# Patient Record
Sex: MALE | Race: WHITE | Employment: OTHER | URBAN - METROPOLITAN AREA
[De-identification: names, ages, dates, MRNs, and addresses within clinical notes are randomized per-mention and may not be internally consistent; named-entity substitution may affect disease eponyms.]

---

## 2018-08-03 ENCOUNTER — HOSPITAL ENCOUNTER (EMERGENCY)
Age: 78
Discharge: HOME | End: 2018-08-03
Payer: MEDICARE

## 2018-08-03 ENCOUNTER — APPOINTMENT (OUTPATIENT)
Dept: ULTRASOUND IMAGING | Age: 78
End: 2018-08-03
Payer: MEDICARE

## 2018-08-03 VITALS
BODY MASS INDEX: 25.48 KG/M2 | RESPIRATION RATE: 16 BRPM | WEIGHT: 182 LBS | HEIGHT: 71 IN | SYSTOLIC BLOOD PRESSURE: 115 MMHG | DIASTOLIC BLOOD PRESSURE: 73 MMHG | OXYGEN SATURATION: 99 % | TEMPERATURE: 98.1 F | HEART RATE: 76 BPM

## 2018-08-03 DIAGNOSIS — M79.605 LEFT LEG PAIN: Primary | ICD-10-CM

## 2018-08-03 PROCEDURE — 93971 EXTREMITY STUDY: CPT

## 2018-08-03 PROCEDURE — 99283 EMERGENCY DEPT VISIT LOW MDM: CPT

## 2018-08-03 ASSESSMENT — PAIN DESCRIPTION - LOCATION: LOCATION: LEG

## 2018-08-03 ASSESSMENT — PAIN DESCRIPTION - DESCRIPTORS: DESCRIPTORS: ACHING

## 2018-08-03 ASSESSMENT — PAIN SCALES - GENERAL: PAINLEVEL_OUTOF10: 4

## 2018-08-03 ASSESSMENT — PAIN DESCRIPTION - ORIENTATION: ORIENTATION: LOWER;LEFT

## 2018-08-03 NOTE — ED PROVIDER NOTES
along the deep venous system yes (1)     Paralysis, paresis, or recent cast immobilization of lower extremities no (0)     Recently bedridden ? 3 days, or major surgery requiring regional or                    general anesthetic in the past 12 weeks no (0)     Alternative diagnosis at least as likely no (0)     TOTAL SCORE 4     * Those with Wells scores of two or more have a 28% chance of having DVT, those with    a lower score have 6% odds. ** Alternatively, Wells scores can be categorized as high if greater than two, moderate if      one or two, and low if less than one, with likelihoods of 53%, 17%, and 5%     respectively. ROS    Pertinent positives and negatives are stated within HPI, all other systems reviewed and are negative. Past Surgical History:  has a past surgical history that includes Appendectomy; Neck surgery; Prostatectomy (7/15/2013); and Colonoscopy (5/09/2016). Social History:  reports that he has never smoked. He has never used smokeless tobacco. He reports that he drinks about 2.4 oz of alcohol per week . He reports that he does not use drugs. Family History: family history includes Arthritis in his mother; Diabetes in his father; Stroke in his father. Allergies: Patient has no known allergies. Physical Exam          ED Triage Vitals   BP Temp Temp src Pulse Resp SpO2 Height Weight   08/03/18 1144 08/03/18 1144 -- 08/03/18 1144 08/03/18 1148 08/03/18 1144 08/03/18 1144 08/03/18 1144   115/73 98.1 °F (36.7 °C)  76 16 99 % 5' 11\" (1.803 m) 182 lb (82.6 kg)     Oxygen Saturation Interpretation: Normal.    Constitutional:  Alert, development consistent with age. HEENT:  NC/NT. Airway patent. Neck:  Normal ROM. Supple. Respiratory:  Clear to auscultation and breath sounds equal.  CV:  Regular rate and rhythm, normal heart sounds, without pathological murmurs, ectopy, gallops, or rubs. GI:  Abdomen Soft, nontender, good bowel sounds. No firm or pulsatile mass.   Lower Ext.: Left: lower leg and thigh. Tenderness: Mild. Swelling: Mild. Deformity: No.             ROM: full range of motion. Calf:  Left, Posterior calf tenderness present. .            Edema:  pitting Left lower extremity(s). Skin:  tenderness and swelling. Distal Function:              Motor deficit: none. Sensory deficit: none. Pulse deficit: none. Capillary refill: normal.  Integument:  Normal turgor. Warm, dry, without visible rash, unless noted elsewhere. Neurological:  Oriented. Motor functions intact. Lab / Imaging Results   (All laboratory and radiology results have been personally reviewed by myself)  Labs:  No results found for this visit on 08/03/18. Imaging: All Radiology results interpreted by Radiologist unless otherwise noted. US DUP LOWER EXTREMITY LEFT JOSEPH   Final Result   No evidence of deep venous thrombosis in the left lower extremity. ED Course / Medical Decision Making   Medications - No data to display     Consult(s):   None    Procedure(s):   none    MDM:   DVT by ultrasound. Plan is for patient to follow with primary care and continue anti-inflammatory usage. Counseling: The emergency provider has spoken with the patient and spouse/SO and discussed todays results, in addition to providing specific details for the plan of care and counseling regarding the diagnosis and prognosis. Questions are answered at this time and they are agreeable with the plan. Assessment      1. Left leg pain      Plan   Discharge to home  Patient condition is good    New Medications     New Prescriptions    No medications on file     Electronically signed by CHANDLER Dey   DD: 8/3/18  **This report was transcribed using voice recognition software. Every effort was made to ensure accuracy; however, inadvertent computerized transcription errors may be present.   END OF ED PROVIDER NOTE       Daljit Arciniega, [address]  08/03/18 0907

## 2018-08-07 ENCOUNTER — CARE COORDINATION (OUTPATIENT)
Dept: CARE COORDINATION | Age: 78
End: 2018-08-07

## 2018-08-07 NOTE — CARE COORDINATION
Ambulatory Care Coordination ED Follow up Call       Reason for ED Visit:  Leg pain  Care Management Risk Score: CMRS 0  How are you feeling? :     improved  Patient Reports the following:  Patient reports his leg is fine. He has no pain and the swelling has been there his whole life. Did you call your PCP prior to going to the ED? Yes          Post Discharge Status:  What health concerns since you left the Emergency Room? None    Do you have wounds that you are caring for at home? No    Do you have a follow up appt scheduled? No. Was told as needed. Review of Instructions:                                 Do you have any questions regarding your discharge instructions?:  No  Medications:    What questions do you have about your medications? None  Are you taking your medications as directed? If not - why? Yes   Can you afford your medications? yes  ADLS:  Do you need assistance of any kind at home? No   What assistance is needed? None      FU appts/Provider:    Future Appointments  Date Time Provider Josue Salazar   9/7/2018 10:00 AM DO MARSHALL Chapman [address] Maintenance Due   Topic Date Due    DTaP/Tdap/Td vaccine (1 - Tdap) 06/10/1960    Shingles Vaccine (1 of 2 - 2 Dose Series) 06/10/1991    Pneumococcal low/med risk (1 of 2 - PCV13) 06/10/2006     Patient advised to contact PCP office to have HM items/records faxed to PCP Office directly?   N/A

## 2018-08-20 PROBLEM — E78.01 FAMILIAL HYPERCHOLESTEROLEMIA: Status: ACTIVE | Noted: 2018-08-20

## 2018-11-07 ENCOUNTER — EVALUATION (OUTPATIENT)
Dept: PHYSICAL THERAPY | Age: 78
End: 2018-11-07
Payer: MEDICARE

## 2018-11-07 DIAGNOSIS — R10.32 LEFT INGUINAL PAIN: Primary | ICD-10-CM

## 2018-11-07 PROBLEM — R10.31 RIGHT INGUINAL PAIN: Status: ACTIVE | Noted: 2018-11-07

## 2018-11-07 PROCEDURE — G8979 MOBILITY GOAL STATUS: HCPCS | Performed by: PHYSICAL THERAPIST

## 2018-11-07 PROCEDURE — 97162 PT EVAL MOD COMPLEX 30 MIN: CPT | Performed by: PHYSICAL THERAPIST

## 2018-11-07 PROCEDURE — G8978 MOBILITY CURRENT STATUS: HCPCS | Performed by: PHYSICAL THERAPIST

## 2018-11-07 NOTE — PROGRESS NOTES
[facility] OUTPATIENT REHABILITATION  PHYSICAL THERAPY INITIAL EVALUATION         Date:  2018   Patient: Gaby Pearson  : 1940  MRN: [id number]  Referring Provider: Daryn Lagos DO  2 Rehabilitation [facility] Shaila Payne      Medical Diagnosis:      Diagnosis Orders   1. Left inguinal pain         Onset date: 5-6 weeks ago    Mechanism of Injury: Strained leg when exiting truck. He reports he had new running boards put on his truck and had to reach out/stretch out to avoid the running board and contact ground. Reports he has pain first thing in the morning, it eases with movements and daily activities, then returns in evening. Also has delayed pain -- can push mow the lawn comfortably, but is sore the next day. Treatment: naproxen       Chief complaint: Pain in anterior leg from hip to knee     SUBJECTIVE:     Pat Medical History  Past Medical History:   Diagnosis Date    Anxiety     GERD (gastroesophageal reflux disease)     Hyperlipidemia     Hypertension     Predominant disturbance of emotions     Prostate cancer [facility]) 2013    Stress      Past Surgical History:   Procedure Laterality Date    APPENDECTOMY      COLONOSCOPY  2016    NECK SURGERY      PROSTATECTOMY  7/15/2013    laparoscpic robotic assisted. bilymph node dissection/ ureteroscopy       Medications:   Current Outpatient Prescriptions   Medication Sig Dispense Refill    citalopram (CELEXA) 20 MG tablet Take 1 tablet by mouth daily 90 tablet 1    hydrochlorothiazide (HYDRODIURIL) 50 MG tablet Take 1 tablet by mouth daily 90 tablet 1    pravastatin (PRAVACHOL) 20 MG tablet Take 1 tablet by mouth every evening 90 tablet 1    multivitamin-iron-minerals-folic acid (CENTRUM) chewable tablet Take 1 tablet by mouth daily.  Cholecalciferol (VITAMIN D) 2000 UNITS CAPS capsule Take by mouth Daily        No current facility-administered medications for this visit.         Imaging results: Impression Ultrasound 08/03/2018   No evidence of deep venous thrombosis in the left lower extremity. Pain:  Current: 4/10     Best: 0/10     Worst:6/10    Aggravated by: Pain first thing in the morning, evening pain, moving to standing position, stairs (up worse than down)    Relieved by: Light activity     Symptom Type/Quality: stabbing    Patient Goals: Pain control    Precautions/Contraindications: None    OBJECTIVE:     Inspection:  Standing  Knees:  [x] Normal  [] Genu Valgus [] Genu Varus  [] Genu Recurvatum    Tibia:  [x] Normal  [] Tibial Varus  [] Tibial Varum    Feet:  [x] Normal  [] Calcaneal Valgus   [] Calcaneal Varus   [] Hallux Valgus    [] Supination  [] Pronation          [] Pes Planus    [] Pes Cavus      Observations: Normal orthopedic exam    Gait:  Normal    Joint/Motion:  Trunk:  Trunk ROM is WFL. Repeated movements are painless and do not exacerbate hip pain. Abdominal straining had no effect. Tension on hip flexors bad    No ttp at hip or inguinal line      Hip arom and stretch pain    Hip:  Right:   AROM: 120° Flexion,  20° Extension, 40° Abduction,  40° ER, 40° IR  Left:   AROM: 120° Flexion,  20° Extension, 40° Abduction,  40° ER, 40° IR. Active hip flexion causes report of pain. Stretch applied to hip flexors also causes report of pain. Knee:  Right:   AROM: WNL and painless  Left:   AROM: WNL and painless    Strength:  Hip:  Right: Flexion 5/5,  Extension 5/5, Abduction 5/5, ER 5/5, IR 5/5    Left: Flexion 3/5,  Extension 5/5, Abduction 4/5, ER 5/5, IR 5/5     Knee:   Right: Flexion 5/5,  Extension 5/5  Left: Flexion 5/5,  Extension 5/5    Palpation: Tender to palpation area of quad approximately 4\" above patella. No step off deformity. Good quad control and strength. No tenderness at proximal hip.      Special Tests/Functional Screens:    [x] Hip Scour []+ / [x] -  [x] Joshua Gonsalez [x]+ / [] -   [] Trendelenburg []+ / [] -    [] Lachman's []+ / [] -    [] Anterior Drawer []+ / [] -   [] Valgus Stress []+ / [] -  [] Thessaly Test []+ / [] -   [] Ankle Inversion Stress []+ / [] -   [] Ankle Squeeze Test []+ / [] -   [] Other: []+ / [] - [x] Ute Delcid []+ / [x] -      [] TRACI []+ / [] -   [] Elizabeth []+ / [] -   [] Pivot Shift []+ / [] -   [] Posterior Drawer []+ / [] -   [] Varus Stress []+ / [] -   [] Ankle Ant Drawer []+ / [] -     [] Ankle Eversion Stress: []+ / [] -  [] Russell Test []+ / [] -         ASSESSMENT     Problems:   1. Pain reported 4-6/10  2. ROM: painful hip flexion AROM  3. Strength 3/5 hip flexors with pain  4. Decreased functional ability with daily activities     [x] There are no barriers affecting plan of care or recovery    [] Barriers to this patient's plan of care or recovery include. Domestic Concerns:  [x] No  [] Yes:    Short Term goals (3 weeks)  1. Decrease reported pain to 3-4/10  2. Increase Strength to 4/5     Long Term goals (6-8 weeks)  1. Decrease reported pain to 0/10  2. Increase Strength to 5/5   3. Able to perform/complete the following functions/tasks: daily chores with comfort and no next-day pain  4. Independent with Home Exercise Programs    Outcome Measure: PT G-Codes  Functional Limitation: Mobility: Walking and moving around  Mobility: Walking and Moving Around Current Status ():  At least 20 percent but less than 40 percent impaired, limited or restricted  Mobility: Walking and Moving Around Goal Status (): 0 percent impaired, limited or restricted    Rehab Potential: [x] Good  [] Fair  [] Poor    PLAN       Treatment Plan:  [x] Therapeutic Exercise  [x] Therapeutic Activity  [x] Neuromuscular Re-education   [] Gait Training  [] Balance Training  [] Aerobic conditioning  [] Manual Therapy  [] Massage/Fascial release   [] Work/Sport specific activities    [] Pain Neuroscience [x] Cold/hotpack  [] Vasocompression  [] Electrical Stimulation  [] Lumbar/Cervical Traction  [] Ultrasound   [] Iontophoresis: 4 mg/mL

## 2018-11-07 NOTE — PROGRESS NOTES
Activities        TA     Gait Training          GT     Neuro Re-education NR     Modalities     Non-Billable Service Time     Other     Total Time/Units 60 1

## 2018-11-14 ENCOUNTER — TREATMENT (OUTPATIENT)
Dept: PHYSICAL THERAPY | Age: 78
End: 2018-11-14
Payer: MEDICARE

## 2018-11-14 DIAGNOSIS — R10.32 LEFT INGUINAL PAIN: Primary | ICD-10-CM

## 2018-11-14 PROCEDURE — 97110 THERAPEUTIC EXERCISES: CPT | Performed by: PHYSICAL THERAPIST

## 2018-11-21 ENCOUNTER — TREATMENT (OUTPATIENT)
Dept: PHYSICAL THERAPY | Age: 78
End: 2018-11-21
Payer: MEDICARE

## 2018-11-21 DIAGNOSIS — R10.32 LEFT INGUINAL PAIN: Primary | ICD-10-CM

## 2018-11-21 PROCEDURE — 97110 THERAPEUTIC EXERCISES: CPT | Performed by: PHYSICAL THERAPIST

## 2018-11-21 NOTE — PROGRESS NOTES
Physical Therapy Daily Treatment Note    Date: 2018  Patient Name: Ryan Antonio  Spouse: Katty Vann  : 1940   MRN: [id number]  DOInjury: 6 weeks ago (~mid-Sept)   Referring Provider: Jordan Shipman DO  2 Rehabilitation [facility]      Medical Diagnosis:      Diagnosis Orders   1. Left inguinal pain         Outcome Measure:     PT G-Codes  Functional Limitation: Mobility: Walking and moving around  Mobility: Walking and Moving Around Current Status (): At least 20 percent but less than 40 percent impaired, limited or restricted  Mobility: Walking and Moving Around Goal Status (): 0 percent impaired, limited or restricted    S: Feeling much better since last Rx. Reports 50-60% improvement overall. O:   Time 9539-6046     Visit 3/12     Pain 4/10     ROM      Modalities      Ice   MO   Manual            Stretch      Prone self quad stretch  5-10 sec hold x 5 Added pillow under knee; little effect; removed TE   Supine or standing hip flexor stretch  Attempted; pain reported; stopped; may attempt next Rx  TE   Exercise      Nustep   TE   Heel slides Pain reported; discontinued   TE   QS Did just prior to PT; reviewed form   TE   Glute sets Did just prior to PT; reviewed form      SLR   TE   SAQ   TE   Bridging  Reviewed form  TE   Clamshell Reviewed form   TE   Knee Extension Machine   TE   Hamstring Curl Machine   TE   [] TG  [] Leg Press 2-leg   TE   [] TG  [] Leg Press 1-leg   TE   Step-ups - FWD Opted to avoid due to knee pain  TE   Standing hip flex Pain reported; stopped; may attempt next Rx  TE   Step-ups - BWD   TE   Calf Raises   TE   Squats Reviewed form; cues provided, doing at home. TE      TE               A:  Tolerated well. P: Continue with rehab plan; will see in 10 days.   Merari Giles PT    Treatment Charges: Mins Units   Initial Evaluation     Re-Evaluation     Ther Exercise         TE 15 1   Manual Therapy     MT     Ther Activities        TA     Gait Training

## 2018-12-04 ENCOUNTER — TREATMENT (OUTPATIENT)
Dept: PHYSICAL THERAPY | Age: 78
End: 2018-12-04
Payer: MEDICARE

## 2018-12-04 DIAGNOSIS — R10.32 LEFT INGUINAL PAIN: Primary | ICD-10-CM

## 2018-12-04 PROCEDURE — 97110 THERAPEUTIC EXERCISES: CPT | Performed by: PHYSICAL THERAPIST

## 2018-12-19 ENCOUNTER — TREATMENT (OUTPATIENT)
Dept: PHYSICAL THERAPY | Age: 78
End: 2018-12-19
Payer: MEDICARE

## 2018-12-19 DIAGNOSIS — R10.32 LEFT INGUINAL PAIN: Primary | ICD-10-CM

## 2018-12-19 PROCEDURE — G8978 MOBILITY CURRENT STATUS: HCPCS | Performed by: PHYSICAL THERAPIST

## 2018-12-19 PROCEDURE — 97110 THERAPEUTIC EXERCISES: CPT | Performed by: PHYSICAL THERAPIST

## 2018-12-19 PROCEDURE — G8979 MOBILITY GOAL STATUS: HCPCS | Performed by: PHYSICAL THERAPIST

## 2018-12-19 PROCEDURE — G8980 MOBILITY D/C STATUS: HCPCS | Performed by: PHYSICAL THERAPIST

## 2019-03-22 ENCOUNTER — HOSPITAL ENCOUNTER (OUTPATIENT)
Age: 79
Discharge: HOME | End: 2019-03-22
Payer: MEDICARE

## 2019-03-22 DIAGNOSIS — M79.10 MYALGIA: ICD-10-CM

## 2019-03-22 LAB — TOTAL CK: 63 U/L (ref 20–200)

## 2019-03-22 PROCEDURE — 82550 ASSAY OF CK (CPK): CPT

## 2019-03-22 PROCEDURE — 36415 COLL VENOUS BLD VENIPUNCTURE: CPT

## 2019-04-26 ENCOUNTER — HOSPITAL ENCOUNTER (OUTPATIENT)
Age: 79
Discharge: HOME | End: 2019-04-28
Payer: MEDICARE

## 2019-04-26 PROCEDURE — 87088 URINE BACTERIA CULTURE: CPT

## 2019-04-26 PROCEDURE — 88112 CYTOPATH CELL ENHANCE TECH: CPT

## 2019-04-26 PROCEDURE — 87186 SC STD MICRODIL/AGAR DIL: CPT

## 2019-04-28 LAB
ORGANISM: ABNORMAL
URINE CULTURE, ROUTINE: ABNORMAL
URINE CULTURE, ROUTINE: ABNORMAL

## 2019-05-14 ENCOUNTER — HOSPITAL ENCOUNTER (OUTPATIENT)
Age: 79
Discharge: HOME | End: 2019-05-16
Payer: MEDICARE

## 2019-05-14 PROCEDURE — 87088 URINE BACTERIA CULTURE: CPT

## 2019-05-16 LAB — URINE CULTURE, ROUTINE: NORMAL

## 2019-06-25 ENCOUNTER — HOSPITAL ENCOUNTER (OUTPATIENT)
Age: 79
Discharge: HOME | End: 2019-06-27
Payer: MEDICARE

## 2019-06-25 PROCEDURE — 88112 CYTOPATH CELL ENHANCE TECH: CPT

## 2019-08-12 ENCOUNTER — HOSPITAL ENCOUNTER (OUTPATIENT)
Dept: ULTRASOUND IMAGING | Age: 79
Discharge: HOME | End: 2019-08-12
Payer: MEDICARE

## 2019-08-12 DIAGNOSIS — R31.0 GROSS HEMATURIA: ICD-10-CM

## 2019-08-12 PROCEDURE — 76775 US EXAM ABDO BACK WALL LIM: CPT

## 2019-10-29 ENCOUNTER — HOSPITAL ENCOUNTER (OUTPATIENT)
Age: 79
Discharge: HOME | End: 2019-10-31
Payer: MEDICARE

## 2019-10-29 PROCEDURE — 88112 CYTOPATH CELL ENHANCE TECH: CPT

## 2020-02-18 ENCOUNTER — HOSPITAL ENCOUNTER (OUTPATIENT)
Age: 80
Discharge: HOME | End: 2020-02-20
Payer: MEDICARE

## 2020-02-18 PROCEDURE — 88112 CYTOPATH CELL ENHANCE TECH: CPT

## 2021-07-06 ENCOUNTER — HOSPITAL ENCOUNTER (EMERGENCY)
Age: 81
Discharge: HOME | End: 2021-07-06
Payer: MEDICARE

## 2021-07-06 ENCOUNTER — APPOINTMENT (OUTPATIENT)
Dept: GENERAL RADIOLOGY | Age: 81
End: 2021-07-06
Payer: MEDICARE

## 2021-07-06 VITALS
DIASTOLIC BLOOD PRESSURE: 73 MMHG | TEMPERATURE: 97.7 F | HEART RATE: 74 BPM | OXYGEN SATURATION: 96 % | WEIGHT: 182 LBS | SYSTOLIC BLOOD PRESSURE: 115 MMHG | BODY MASS INDEX: 25.48 KG/M2 | HEIGHT: 71 IN | RESPIRATION RATE: 20 BRPM

## 2021-07-06 DIAGNOSIS — S20.212A CONTUSION OF LEFT CHEST WALL, INITIAL ENCOUNTER: Primary | ICD-10-CM

## 2021-07-06 PROCEDURE — 71101 X-RAY EXAM UNILAT RIBS/CHEST: CPT

## 2021-07-06 PROCEDURE — 99212 OFFICE O/P EST SF 10 MIN: CPT

## 2021-07-06 ASSESSMENT — PAIN DESCRIPTION - LOCATION: LOCATION: RIB CAGE

## 2021-07-06 ASSESSMENT — PAIN DESCRIPTION - ORIENTATION: ORIENTATION: LEFT

## 2021-07-06 ASSESSMENT — PAIN SCALES - GENERAL: PAINLEVEL_OUTOF10: 5

## 2021-07-06 ASSESSMENT — PAIN DESCRIPTION - PAIN TYPE: TYPE: ACUTE PAIN

## 2021-07-06 NOTE — ED PROVIDER NOTES
[facility]  Department of Emergency Medicine  UC Encounter Note  21   12:25 PM EDT      NAME: Sylvie Oakley  :  1940  MRN:  [id number]    Chief Complaint: Rib Injury (fell  saturday  injuried left rib area)      This is a 50-year-old male the presents to urgent care complaining of left lateral rib cage pain and bruising for the 3 days. States several days ago he lost his footing and landed on his left rib cage area on some concrete. Denies head neck back, other chest or extremity pain. No abdominal pain or hip pain. Does state some pain with breathing and movement. But no coughing up of blood. No shortness of breath. No nausea vomiting diarrhea or urinary symptoms. On first contact patient he appears to be in no acute distress. Review of Systems  Pertinent positives and negatives are stated within HPI, all other systems reviewed and are negative. Physical Exam  Vitals and nursing note reviewed. Constitutional:       Appearance: He is well-developed. HENT:      Head: Normocephalic and atraumatic. Jaw: There is normal jaw occlusion. No trismus. Right Ear: Hearing, tympanic membrane, ear canal and external ear normal.      Left Ear: Hearing, tympanic membrane, ear canal and external ear normal.      Nose: Nose normal.      Right Sinus: No maxillary sinus tenderness or frontal sinus tenderness. Left Sinus: No maxillary sinus tenderness or frontal sinus tenderness. Mouth/Throat:      Pharynx: Uvula midline. No uvula swelling. Eyes:      General: Lids are normal.      Conjunctiva/sclera: Conjunctivae normal.      Pupils: Pupils are equal, round, and reactive to light. Cardiovascular:      Rate and Rhythm: Normal rate and regular rhythm. Heart sounds: Normal heart sounds. No murmur heard. Pulmonary:      Effort: Pulmonary effort is normal.      Breath sounds: Normal breath sounds.    Chest:      Chest wall: Tenderness present. No lacerations, deformity, swelling, crepitus or edema. There is no dullness to percussion. Breasts: Breasts are symmetrical.      Comments: Left lateral rib cage has a bruised area about 2 cm in diameter. This is a mildly purple bruise. No open area. No red streaking. Abdominal:      General: Bowel sounds are normal.      Palpations: Abdomen is soft. Abdomen is not rigid. Tenderness: There is no abdominal tenderness. There is no guarding or rebound. Musculoskeletal:      Cervical back: Full passive range of motion without pain, normal range of motion and neck supple. No spinous process tenderness or muscular tenderness. Comments: Arms and legs are nontender no injury. No hip back abdomen or leg pain. Skin:     General: Skin is warm and dry. Findings: No abrasion or rash. Neurological:      Mental Status: He is alert and oriented to person, place, and time. GCS: GCS eye subscore is 4. GCS verbal subscore is 5. GCS motor subscore is 6. Cranial Nerves: Cranial nerves are intact. No cranial nerve deficit. Sensory: Sensation is intact. No sensory deficit. Motor: Motor function is intact. Coordination: Coordination is intact. Coordination normal.      Gait: Gait is intact. Gait normal.         Procedures    MDM  Number of Diagnoses or Management Options  Diagnosis management comments: Xray neg  Instructions given. No acute distress           --------------------------------------------- PAST HISTORY ---------------------------------------------  Past Medical History:  has a past medical history of Anxiety, GERD (gastroesophageal reflux disease), Hyperlipidemia, Hypertension, Predominant disturbance of emotions, Prostate cancer ([facility] [address] and Stress. Past Surgical History:  has a past surgical history that includes Appendectomy; Neck surgery; Prostatectomy (7/15/2013); and Colonoscopy (5/09/2016). Social History:  reports that he has never smoked.  He has never used smokeless tobacco. He reports current alcohol use of about 4.0 standard drinks of alcohol per week. He reports that he does not use drugs. Family History: family history includes Arthritis in his mother; Diabetes in his father; Stroke in his father. The patients home medications have been reviewed. Allergies: Patient has no known allergies. -------------------------------------------------- RESULTS -------------------------------------------------  No results found for this visit on 07/06/21. XR RIBS LEFT INCLUDE CHEST (MIN 3 VIEWS)   Final Result   No acute process             ------------------------- NURSING NOTES AND VITALS REVIEWED ---------------------------   The nursing notes within the ED encounter and vital signs as below have been reviewed. /73   Pulse 74   Temp 97.7 °F (36.5 °C) (Infrared)   Resp 20   Ht 5' 11\" (1.803 m)   Wt 182 lb (82.6 kg)   SpO2 96%   BMI 25.38 kg/m²   Oxygen Saturation Interpretation: Normal      ------------------------------------------ PROGRESS NOTES ------------------------------------------   I have spoken with the patient and discussed todays results, in addition to providing specific details for the plan of care and counseling regarding the diagnosis and prognosis. Their questions are answered at this time and they are agreeable with the plan.      --------------------------------- ADDITIONAL PROVIDER NOTES ---------------------------------     This patient is stable for discharge. I have shared the specific conditions for return, as well as the importance of follow-up. * NOTE: This report was transcribed using voice recognition software. Every effort was made to ensure accuracy; however, inadvertent computerized transcription errors may be present.    --------------------------------- IMPRESSION AND DISPOSITION ---------------------------------    IMPRESSION  1.  Contusion of left chest wall, initial encounter

## 2022-03-11 PROBLEM — R21 RASH: Status: ACTIVE | Noted: 2022-03-11

## 2022-07-03 ENCOUNTER — HOSPITAL ENCOUNTER (EMERGENCY)
Age: 82
Discharge: HOME | End: 2022-07-03
Payer: MEDICARE

## 2022-07-03 VITALS
OXYGEN SATURATION: 98 % | DIASTOLIC BLOOD PRESSURE: 71 MMHG | HEIGHT: 71 IN | RESPIRATION RATE: 18 BRPM | TEMPERATURE: 98.6 F | BODY MASS INDEX: 25.48 KG/M2 | WEIGHT: 182 LBS | HEART RATE: 79 BPM | SYSTOLIC BLOOD PRESSURE: 112 MMHG

## 2022-07-03 DIAGNOSIS — J06.9 VIRAL URI: Primary | ICD-10-CM

## 2022-07-03 PROCEDURE — 99211 OFF/OP EST MAY X REQ PHY/QHP: CPT

## 2022-07-03 ASSESSMENT — PAIN - FUNCTIONAL ASSESSMENT: PAIN_FUNCTIONAL_ASSESSMENT: 0-10

## 2022-07-03 ASSESSMENT — PAIN SCALES - GENERAL: PAINLEVEL_OUTOF10: 0

## 2022-07-03 NOTE — ED PROVIDER NOTES
[facility] [facility]  Department of Emergency Medicine  UC Encounter Note  7/3/22   12:56 PM EDT      NAME: Coty Diaz  :  1940  MRN:  [id number]    Chief Complaint: Pharyngitis (liliam ears hurt)      This is an 81-year-old male the presents to urgent care complaining of a sore throat this morning. He does complain of some mild nasal drainage. He denies any shortness of breath. This been a slight cough. He denies abdominal pain nausea vomiting diarrhea or urinary symptoms. On first contact patient appears to be in no acute distress. Review of Systems  Pertinent positives and negatives are stated within HPI, all other systems reviewed and are negative. Physical Exam  Vitals and nursing note reviewed. Constitutional:       Appearance: He is well-developed. HENT:      Head: Normocephalic and atraumatic. Jaw: There is normal jaw occlusion. No trismus. Right Ear: Hearing, tympanic membrane, ear canal and external ear normal.      Left Ear: Hearing, tympanic membrane, ear canal and external ear normal.      Nose: Rhinorrhea present. No congestion. Right Sinus: No maxillary sinus tenderness or frontal sinus tenderness. Left Sinus: No maxillary sinus tenderness or frontal sinus tenderness. Mouth/Throat:      Mouth: Mucous membranes are moist.      Pharynx: Oropharynx is clear. Uvula midline. Posterior oropharyngeal erythema (mild) present. No pharyngeal swelling, oropharyngeal exudate or uvula swelling. Comments: Oropharynx is patent. Eyes:      General: Lids are normal.      Conjunctiva/sclera: Conjunctivae normal.      Pupils: Pupils are equal, round, and reactive to light. Cardiovascular:      Rate and Rhythm: Normal rate and regular rhythm. Heart sounds: Normal heart sounds. No murmur heard. Pulmonary:      Effort: Pulmonary effort is normal.      Breath sounds: Normal breath sounds.    Abdominal:      General: Bowel sounds are normal.      Palpations: Abdomen is soft. Abdomen is not rigid. Tenderness: There is no abdominal tenderness. There is no guarding or rebound. Musculoskeletal:      Cervical back: Full passive range of motion without pain, normal range of motion and neck supple. Skin:     General: Skin is warm and dry. Findings: No abrasion or rash. Neurological:      General: No focal deficit present. Mental Status: He is alert and oriented to person, place, and time. GCS: GCS eye subscore is 4. GCS verbal subscore is 5. GCS motor subscore is 6. Cranial Nerves: No cranial nerve deficit. Sensory: No sensory deficit. Coordination: Coordination normal.      Gait: Gait normal.         Procedures    MDM  Number of Diagnoses or Management Options  Viral URI  Diagnosis management comments: This is an 71-year-old male in no acute distress. He has very mild URI symptoms at this time probable viral in nature could be environmentally related. I did recommend he just take medications for his symptoms such as Claritin for the runny nose and postnasal drip. May take some Tylenol use a cough drop. His vital signs are stable his lungs are clear instructions given I did tell him to follow-up with his primary later on this week if not any better. --------------------------------------------- PAST HISTORY ---------------------------------------------  Past Medical History:  has a past medical history of Anxiety, GERD (gastroesophageal reflux disease), Hyperlipidemia, Hypertension, Predominant disturbance of emotions, [facility] and Stress. Past Surgical History:  has a past surgical history that includes Appendectomy; Neck surgery; Prostatectomy (7/15/2013); and Colonoscopy (5/09/2016). Social History:  reports that he has never smoked. He has never used smokeless tobacco. He reports current alcohol use of about 4.0 standard drinks of alcohol per week.  He reports that he does not use drugs. Family History: family history includes Arthritis in his mother; Diabetes in his father; Stroke in his father. The patients home medications have been reviewed. Allergies: Patient has no known allergies. -------------------------------------------------- RESULTS -------------------------------------------------  No results found for this visit on 07/03/22. No orders to display       ------------------------- NURSING NOTES AND VITALS REVIEWED ---------------------------   The nursing notes within the ED encounter and vital signs as below have been reviewed. /71   Pulse 79   Temp 98.6 °F (37 °C)   Resp 18   Ht 5' 11\" (1.803 m)   Wt 182 lb (82.6 kg)   SpO2 98%   BMI 25.38 kg/m²   Oxygen Saturation Interpretation: Normal      ------------------------------------------ PROGRESS NOTES ------------------------------------------   I have spoken with the patient and discussed todays results, in addition to providing specific details for the plan of care and counseling regarding the diagnosis and prognosis. Their questions are answered at this time and they are agreeable with the plan.      --------------------------------- ADDITIONAL PROVIDER NOTES ---------------------------------     This patient is stable for discharge. I have shared the specific conditions for return, as well as the importance of follow-up. * NOTE: This report was transcribed using voice recognition software. Every effort was made to ensure accuracy; however, inadvertent computerized transcription errors may be present.    --------------------------------- IMPRESSION AND DISPOSITION ---------------------------------    IMPRESSION  1.  Viral URI        DISPOSITION  Disposition: Discharge to home  Patient condition is good       Rosa Maria Mariee PA-C  07/03/22 [id number]

## 2022-10-10 SDOH — HEALTH STABILITY: PHYSICAL HEALTH: ON AVERAGE, HOW MANY MINUTES DO YOU ENGAGE IN EXERCISE AT THIS LEVEL?: 40 MIN

## 2022-10-10 SDOH — HEALTH STABILITY: PHYSICAL HEALTH: ON AVERAGE, HOW MANY DAYS PER WEEK DO YOU ENGAGE IN MODERATE TO STRENUOUS EXERCISE (LIKE A BRISK WALK)?: 5 DAYS

## 2022-10-10 ASSESSMENT — SOCIAL DETERMINANTS OF HEALTH (SDOH)
WITHIN THE LAST YEAR, HAVE TO BEEN RAPED OR FORCED TO HAVE ANY KIND OF SEXUAL ACTIVITY BY YOUR PARTNER OR EX-PARTNER?: NO
WITHIN THE LAST YEAR, HAVE YOU BEEN HUMILIATED OR EMOTIONALLY ABUSED IN OTHER WAYS BY YOUR PARTNER OR EX-PARTNER?: NO
WITHIN THE LAST YEAR, HAVE YOU BEEN KICKED, HIT, SLAPPED, OR OTHERWISE PHYSICALLY HURT BY YOUR PARTNER OR EX-PARTNER?: NO
WITHIN THE LAST YEAR, HAVE YOU BEEN AFRAID OF YOUR PARTNER OR EX-PARTNER?: NO

## 2022-10-12 ENCOUNTER — PREP FOR PROCEDURE (OUTPATIENT)
Dept: ORTHOPEDIC SURGERY | Age: 82
End: 2022-10-12

## 2022-10-12 ENCOUNTER — OFFICE VISIT (OUTPATIENT)
Dept: ORTHOPEDIC SURGERY | Age: 82
End: 2022-10-12
Payer: MEDICARE

## 2022-10-12 ENCOUNTER — TELEPHONE (OUTPATIENT)
Dept: ORTHOPEDIC SURGERY | Age: 82
End: 2022-10-12

## 2022-10-12 VITALS — BODY MASS INDEX: 25.2 KG/M2 | HEIGHT: 71 IN | TEMPERATURE: 98 F | WEIGHT: 180 LBS

## 2022-10-12 DIAGNOSIS — M65.342 TRIGGER FINGER, LEFT RING FINGER: Primary | ICD-10-CM

## 2022-10-12 PROCEDURE — 99203 OFFICE O/P NEW LOW 30 MIN: CPT | Performed by: ORTHOPAEDIC SURGERY

## 2022-10-12 PROCEDURE — G8427 DOCREV CUR MEDS BY ELIG CLIN: HCPCS | Performed by: ORTHOPAEDIC SURGERY

## 2022-10-12 PROCEDURE — 1123F ACP DISCUSS/DSCN MKR DOCD: CPT | Performed by: ORTHOPAEDIC SURGERY

## 2022-10-12 PROCEDURE — G8484 FLU IMMUNIZE NO ADMIN: HCPCS | Performed by: ORTHOPAEDIC SURGERY

## 2022-10-12 PROCEDURE — 1036F TOBACCO NON-USER: CPT | Performed by: ORTHOPAEDIC SURGERY

## 2022-10-12 PROCEDURE — G8417 CALC BMI ABV UP PARAM F/U: HCPCS | Performed by: ORTHOPAEDIC SURGERY

## 2022-10-12 NOTE — PROGRESS NOTES
Isiaas Couch is a 81 y.o. male, who presents   Chief Complaint   Patient presents with    Hand Pain     Lt trigger finger started about a month ago. No injury or trauma noted. 6/10 pain scale. HPI[de-identified] Left ring finger pains been present for a month or so. There is no history of injury to it. Lizbeth Oliveira is right-hand dominant. He has locking of the left ring finger. He wears a splint at night to keep it straight so does not bother him so much. Allergies; medications; past medical, surgical, family, and social history; and problem list have been reviewed today and updated as indicated in this encounter - see below following Ortho specifics. Musculoskeletal: Skin condition gross neurovascular function is good in the left upper extremity. Wrist and general finger range of motion are good. There is some prominence of the flexor tendon of the left ring finger at the A1 band with catching and snapping. Function otherwise is full. There is no evidence of other pathology. Radiologic Studies: None    ASSESSMENT:  Lizbeth Oliveira was seen today for hand pain. Diagnoses and all orders for this visit:    Trigger finger, left ring finger     Treatment alternatives were reviewed including medical and physical therapies, injections, and surgical options, expected risks benefits and likely outcome of each were discussed in detail, questions asked and answered and understood. We discussed the symptom as well as physical findings. This is typical of trigger finger. Treatment options were discussed including an injection with corticosteroid and surgical decompression. They would like a permanent solution would be the surgical treatment. PLAN: We will schedule flexor tenovaginotomy left ring finger as an outpatient procedure.         Patient Active Problem List   Diagnosis    Prostate cancer ([facility]    Familial hypercholesterolemia    Right inguinal pain    Left inguinal pain    Rash       Past Medical History: Diagnosis Date    Anxiety     GERD (gastroesophageal reflux disease)     Hyperlipidemia     Hypertension     Predominant disturbance of emotions     Prostate cancer ([facility] 2013    Stress        Past Surgical History:   Procedure Laterality Date    APPENDECTOMY      COLONOSCOPY  5/09/2016    NECK SURGERY      PROSTATECTOMY  7/15/2013    laparoscpic robotic assisted. bilymph node dissection/ ureteroscopy       Current Outpatient Medications   Medication Sig Dispense Refill    pantoprazole (PROTONIX) 40 MG tablet Take 1 tablet by mouth every morning (before breakfast) 90 tablet 1    citalopram (CELEXA) 20 MG tablet Take 1 tablet by mouth in the morning. 90 tablet 1    hydroCHLOROthiazide (HYDRODIURIL) 50 MG tablet Take 1 tablet by mouth every morning 90 tablet 1    pravastatin (PRAVACHOL) 20 MG tablet TAKE 1 TABLET EVERY EVENING 90 tablet 1    diclofenac sodium (VOLTAREN) 1 % GEL Apply topically 2 times daily 30 g 0    multivitamin-iron-minerals-folic acid (CENTRUM) chewable tablet Take 1 tablet by mouth daily. Cholecalciferol (VITAMIN D) 2000 UNITS CAPS capsule Take by mouth Daily        No current facility-administered medications for this visit. No Known Allergies    Social History     Socioeconomic History    Marital status: Life Partner     Spouse name: None    Number of children: None    Years of education: None    Highest education level: None   Tobacco Use    Smoking status: Never    Smokeless tobacco: Never   Substance and Sexual Activity    Alcohol use:  Yes     Alcohol/week: 4.0 standard drinks     Types: 1 Cans of beer, 3 Glasses of wine per week     Comment: occassionally    Drug use: No    Sexual activity: Never     Social Determinants of Health     Financial Resource Strain: Low Risk     Difficulty of Paying Living Expenses: Not hard at all   Food Insecurity: No Food Insecurity    Worried About Running Out of Food in the Last Year: Never true    Ran Out of Food in the Last Year: Never true Physical Activity: Sufficiently Active    Days of Exercise per Week: 5 days    Minutes of Exercise per Session: 40 min   Intimate Partner Violence: Not At Risk    Fear of Current or Ex-Partner: No    Emotionally Abused: No    Physically Abused: No    Sexually Abused: No       Family History   Problem Relation Age of Onset    Arthritis Mother     Stroke Father     Diabetes Father          Review of Systems:   As follows except as previously noted in HPI:  Constitutional: Negative for chills, diaphoresis,  fever   Respiratory: Negative for cough, shortness of breath and wheezing. Cardiovascular: Negative for chest pain and palpitations. Neurological: Negative for dizziness, syncope,   GI / : abdominal pain or cramping  Musculoskeletal: see HPI       Objective:   Physical Exam   Constitutional: Oriented to person, place, and time. and appears well-developed and well-nourished. :   Head: Normocephalic and atraumatic. Neck: Neck supple. Eyes: EOM are normal.   Pulmonary/Chest: Effort normal.  No respiratory distress, no wheezes. Neurological: Alert and oriented to person  Skin: Skin is warm and dry. Renetta Ibarra DO    10/12/22  8:52 AM    All reasonable efforts have been made to minimize the risk of errors that may occur in the use of voice recognition and other electronic means of charting.

## 2022-10-12 NOTE — TELEPHONE ENCOUNTER
Prior Authorization Form:      DEMOGRAPHICS:                     Patient Name:  Penelope Burrell  Patient :  1940            Insurance:  Payor: Eliana Chimera / Plan: Demetrio Edmond G[employer] HMO / Product Type: *No Product type* /   Insurance ID Number:    Payer/Plan Subscr  Sex Relation Sub. Ins. ID Effective Group Num   1.  Demetrio Edmond MEDICAMarthe Bernabe 1940 Male Self [id number]                                    [address] [id number]         DIAGNOSIS & PROCEDURE:                       Procedure/Operation: TRIGGER FINGER RELEASE - LEFT RING           CPT Code: 54299    Diagnosis:  TRIGGER FINGER LEFT RING    ICD10 Code: U73.555    Location:  [address] [place] [address]    Surgeon:  Palma Salazar D.O.    SCHEDULING INFORMATION:                          Date: 10/20/2022    Time: TBA              Anesthesia:  Deanna Block                                                       Status:  Outpatient        Special Comments:  NONE       Electronically signed by Mahi Correa on 10/12/2022 at 1:41 PM

## 2022-10-17 ENCOUNTER — ANESTHESIA EVENT (OUTPATIENT)
Dept: OPERATING ROOM | Age: 82
End: 2022-10-17
Payer: MEDICARE

## 2022-10-17 NOTE — ANESTHESIA PRE PROCEDURE
Department of Anesthesiology  Preprocedure Note       Name:  Odin Holguin   Age:  81 y.o.  :  1940                                          MRN:  [id number]         Date:  10/17/2022      Surgeon: Liz Del Rio):  HÉCTOR Martinez DO    Procedure: Procedure(s):  LEFT FINGER TRIGGER RELEASE    Medications prior to admission:   Prior to Admission medications    Medication Sig Start Date End Date Taking? Authorizing Provider   pantoprazole (PROTONIX) 40 MG tablet Take 1 tablet by mouth every morning (before breakfast) 10/5/22   Caridad Keny, DO   citalopram (CELEXA) 20 MG tablet Take 1 tablet by mouth in the morning. 22   Caridad Keny, DO   hydroCHLOROthiazide (HYDRODIURIL) 50 MG tablet Take 1 tablet by mouth every morning 22   Caridad Keny, DO   pravastatin (PRAVACHOL) 20 MG tablet TAKE 1 TABLET EVERY EVENING 22   Caridad Keny, DO   diclofenac sodium (VOLTAREN) 1 % GEL Apply topically 2 times daily  Patient not taking: No sig reported 21   Caridad Keny, DO   multivitamin-iron-minerals-folic acid (CENTRUM) chewable tablet Take 1 tablet by mouth daily. Historical Provider, MD   Cholecalciferol (VITAMIN D) 2000 UNITS CAPS capsule Take by mouth Daily Takes 3000    Historical Provider, MD       Current medications:    No current facility-administered medications for this encounter. Current Outpatient Medications   Medication Sig Dispense Refill    pantoprazole (PROTONIX) 40 MG tablet Take 1 tablet by mouth every morning (before breakfast) 90 tablet 1    citalopram (CELEXA) 20 MG tablet Take 1 tablet by mouth in the morning.  90 tablet 1    hydroCHLOROthiazide (HYDRODIURIL) 50 MG tablet Take 1 tablet by mouth every morning 90 tablet 1    pravastatin (PRAVACHOL) 20 MG tablet TAKE 1 TABLET EVERY EVENING 90 tablet 1    diclofenac sodium (VOLTAREN) 1 % GEL Apply topically 2 times daily (Patient not taking: No sig reported) 30 g 0    multivitamin-iron-minerals-folic acid (CENTRUM) chewable tablet Take 1 tablet by mouth daily.  Cholecalciferol (VITAMIN D) 2000 UNITS CAPS capsule Take by mouth Daily Takes 3000         Allergies:  No Known Allergies    Problem List:    Patient Active Problem List   Diagnosis Code    [facility] C61    Familial hypercholesterolemia E78.01    Right inguinal pain R10.31    Left inguinal pain R10.32    Rash R21    Trigger ring finger of left hand M65.342       Past Medical History:        Diagnosis Date    Anxiety     GERD (gastroesophageal reflux disease)     Hyperlipidemia     Hypertension     Predominant disturbance of emotions     Prostate [facility] 2013    Stress        Past Surgical History:        Procedure Laterality Date    APPENDECTOMY      COLONOSCOPY  5/09/2016    NECK SURGERY      PROSTATECTOMY  7/15/2013    laparoscpic robotic assisted. bilymph node dissection/ ureteroscopy       Social History:    Social History     Tobacco Use    Smoking status: Never    Smokeless tobacco: Never   Substance Use Topics    Alcohol use: Yes     Alcohol/week: 4.0 standard drinks     Types: 3 Glasses of wine, 1 Cans of beer per week     Comment: 4 nights/wk                                Counseling given: Not Answered      Vital Signs (Current):   Vitals:    10/13/22 1543   Weight: 180 lb (81.6 kg)   Height: 5' 11\" (1.803 m)                                              BP Readings from Last 3 Encounters:   10/17/22 110/62   08/01/22 108/70   07/03/22 112/71       NPO Status:                                                                                 BMI:   Wt Readings from Last 3 Encounters:   10/17/22 178 lb 9.6 oz (81 kg)   10/12/22 180 lb (81.6 kg)   10/05/22 178 lb 12.8 oz (81.1 kg)     Body mass index is 25.1 kg/m².     CBC:   Lab Results   Component Value Date/Time    WBC 3.1 02/23/2021 12:00 AM    RBC 4.53 04/04/2018 12:00 AM    HGB 14.3 02/23/2021 12:00 AM    HCT 42.7 02/23/2021 12:00 AM    MCV 91.1 11/16/2017 07:45 PM    RDW 13.5 11/16/2017 07:45 PM     02/23/2021 12:00 AM       CMP:   Lab Results   Component Value Date/Time     04/04/2018 12:00 AM    K 3.6 05/12/2022 12:00 AM     04/04/2018 12:00 AM    CO2 30 04/04/2018 12:00 AM    BUN 23 04/04/2018 12:00 AM    CREATININE 1.14 05/12/2022 12:00 AM    GFRAA >60 11/16/2017 07:45 PM    LABGLOM 70 04/04/2018 12:00 AM    LABGLOM 54 11/16/2017 07:45 PM    GLUCOSE 119 04/04/2018 12:00 AM    GLUCOSE 83 01/05/2012 10:32 AM    PROT 6.8 11/16/2017 07:45 PM    CALCIUM 8.9 04/04/2018 12:00 AM    BILITOT 0.4 04/04/2018 12:00 AM    ALKPHOS 68 04/04/2018 12:00 AM    AST 29 04/04/2018 12:00 AM    ALT 26 04/04/2018 12:00 AM       POC Tests: No results for input(s): POCGLU, POCNA, POCK, POCCL, POCBUN, POCHEMO, POCHCT in the last 72 hours. Coags:   Lab Results   Component Value Date/Time    PROTIME 13.4 11/16/2017 07:45 PM    INR 1.2 11/16/2017 07:45 PM    APTT 27.5 11/16/2017 07:45 PM       HCG (If Applicable): No results found for: PREGTESTUR, PREGSERUM, HCG, HCGQUANT     ABGs: No results found for: PHART, PO2ART, LJL2IGV, IVO3FPP, BEART, Z0PDZHKN     Type & Screen (If Applicable):  No results found for: LABABO, LABRH    Drug/Infectious Status (If Applicable):  No results found for: HIV, HEPCAB    COVID-19 Screening (If Applicable): No results found for: COVID19        Anesthesia Evaluation  Patient summary reviewed  Airway: Mallampati: II  TM distance: >3 FB   Neck ROM: full  Mouth opening: > = 3 FB   Dental: normal exam         Pulmonary: breath sounds clear to auscultation                             Cardiovascular:    (+) hypertension:, hyperlipidemia      ECG reviewed  Rhythm: regular             Beta Blocker:  Not on Beta Blocker         Neuro/Psych:   (+) depression/anxiety              ROS comment: Emotional Instability. Stress. GI/Hepatic/Renal:   (+) GERD: well controlled,          ROS comment: Cancer Prostate. .   Endo/Other:    (+) malignancy/cancer (Prostate. ). Abdominal:             Vascular: negative vascular ROS. Other Findings:           Anesthesia Plan      Deanna block and MAC     ASA 3       Induction: intravenous. continuous noninvasive hemodynamic monitor    Anesthetic plan and risks discussed with patient. Plan discussed with CRNA. Lynette Brooks MD   10/17/2022    DOS STAFF ADDENDUM:    Pt seen and examined, chart reviewed (including anesthesia, drug and allergy history). Anesthetic plan, risks, benefits, alternatives, and personnel involved discussed with patient. Patient verbalized an understanding and agrees to proceed. Plan discussed with care team members and agreed upon.     Malcolm Yap MD  Staff Anesthesiologist  9:01 AM

## 2022-10-20 ENCOUNTER — ANESTHESIA (OUTPATIENT)
Dept: OPERATING ROOM | Age: 82
End: 2022-10-20
Payer: MEDICARE

## 2022-10-20 ENCOUNTER — HOSPITAL ENCOUNTER (OUTPATIENT)
Age: 82
Setting detail: OUTPATIENT SURGERY
Discharge: HOME | End: 2022-10-20
Attending: ORTHOPAEDIC SURGERY | Admitting: ORTHOPAEDIC SURGERY
Payer: MEDICARE

## 2022-10-20 VITALS
TEMPERATURE: 97.5 F | WEIGHT: 177 LBS | DIASTOLIC BLOOD PRESSURE: 54 MMHG | HEIGHT: 71 IN | BODY MASS INDEX: 24.78 KG/M2 | SYSTOLIC BLOOD PRESSURE: 115 MMHG | OXYGEN SATURATION: 95 % | RESPIRATION RATE: 16 BRPM | HEART RATE: 69 BPM

## 2022-10-20 DIAGNOSIS — M65.342 TRIGGER RING FINGER OF LEFT HAND: ICD-10-CM

## 2022-10-20 PROCEDURE — 26055 INCISE FINGER TENDON SHEATH: CPT | Performed by: ORTHOPAEDIC SURGERY

## 2022-10-20 PROCEDURE — 2500000003 HC RX 250 WO HCPCS: Performed by: ORTHOPAEDIC SURGERY

## 2022-10-20 PROCEDURE — 6360000002 HC RX W HCPCS

## 2022-10-20 PROCEDURE — 3600000002 HC SURGERY LEVEL 2 BASE: Performed by: ORTHOPAEDIC SURGERY

## 2022-10-20 PROCEDURE — 3700000000 HC ANESTHESIA ATTENDED CARE: Performed by: ORTHOPAEDIC SURGERY

## 2022-10-20 PROCEDURE — 3700000001 HC ADD 15 MINUTES (ANESTHESIA): Performed by: ORTHOPAEDIC SURGERY

## 2022-10-20 PROCEDURE — 7100000011 HC PHASE II RECOVERY - ADDTL 15 MIN: Performed by: ORTHOPAEDIC SURGERY

## 2022-10-20 PROCEDURE — 2580000003 HC RX 258: Performed by: ANESTHESIOLOGY

## 2022-10-20 PROCEDURE — 7100000010 HC PHASE II RECOVERY - FIRST 15 MIN: Performed by: ORTHOPAEDIC SURGERY

## 2022-10-20 PROCEDURE — 3600000012 HC SURGERY LEVEL 2 ADDTL 15MIN: Performed by: ORTHOPAEDIC SURGERY

## 2022-10-20 PROCEDURE — 2709999900 HC NON-CHARGEABLE SUPPLY: Performed by: ORTHOPAEDIC SURGERY

## 2022-10-20 RX ORDER — LIDOCAINE HYDROCHLORIDE 5 MG/ML
INJECTION, SOLUTION INFILTRATION; INTRAVENOUS
Status: COMPLETED | OUTPATIENT
Start: 2022-10-20 | End: 2022-10-20

## 2022-10-20 RX ORDER — HYDROMORPHONE HYDROCHLORIDE 1 MG/ML
0.25 INJECTION, SOLUTION INTRAMUSCULAR; INTRAVENOUS; SUBCUTANEOUS EVERY 5 MIN PRN
Status: CANCELLED | OUTPATIENT
Start: 2022-10-20

## 2022-10-20 RX ORDER — HYDROMORPHONE HYDROCHLORIDE 1 MG/ML
0.5 INJECTION, SOLUTION INTRAMUSCULAR; INTRAVENOUS; SUBCUTANEOUS EVERY 5 MIN PRN
Status: CANCELLED | OUTPATIENT
Start: 2022-10-20

## 2022-10-20 RX ORDER — FENTANYL CITRATE 50 UG/ML
INJECTION, SOLUTION INTRAMUSCULAR; INTRAVENOUS PRN
Status: DISCONTINUED | OUTPATIENT
Start: 2022-10-20 | End: 2022-10-20 | Stop reason: SDUPTHER

## 2022-10-20 RX ORDER — SODIUM CHLORIDE, SODIUM LACTATE, POTASSIUM CHLORIDE, CALCIUM CHLORIDE 600; 310; 30; 20 MG/100ML; MG/100ML; MG/100ML; MG/100ML
INJECTION, SOLUTION INTRAVENOUS CONTINUOUS
Status: DISCONTINUED | OUTPATIENT
Start: 2022-10-20 | End: 2022-10-20 | Stop reason: HOSPADM

## 2022-10-20 RX ORDER — ONDANSETRON 2 MG/ML
4 INJECTION INTRAMUSCULAR; INTRAVENOUS
Status: CANCELLED | OUTPATIENT
Start: 2022-10-20 | End: 2022-10-21

## 2022-10-20 RX ORDER — PROPOFOL 10 MG/ML
INJECTION, EMULSION INTRAVENOUS PRN
Status: DISCONTINUED | OUTPATIENT
Start: 2022-10-20 | End: 2022-10-20 | Stop reason: SDUPTHER

## 2022-10-20 RX ORDER — SODIUM CHLORIDE 0.9 % (FLUSH) 0.9 %
5-40 SYRINGE (ML) INJECTION PRN
Status: CANCELLED | OUTPATIENT
Start: 2022-10-20

## 2022-10-20 RX ORDER — ACETAMINOPHEN AND CODEINE PHOSPHATE 300; 30 MG/1; MG/1
1 TABLET ORAL EVERY 4 HOURS PRN
Qty: 30 TABLET | Refills: 0 | Status: SHIPPED | OUTPATIENT
Start: 2022-10-20 | End: 2022-10-27

## 2022-10-20 RX ORDER — SODIUM CHLORIDE 9 MG/ML
INJECTION, SOLUTION INTRAVENOUS PRN
Status: CANCELLED | OUTPATIENT
Start: 2022-10-20

## 2022-10-20 RX ORDER — SODIUM CHLORIDE 0.9 % (FLUSH) 0.9 %
5-40 SYRINGE (ML) INJECTION EVERY 12 HOURS SCHEDULED
Status: CANCELLED | OUTPATIENT
Start: 2022-10-20

## 2022-10-20 RX ADMIN — LIDOCAINE HYDROCHLORIDE 50 ML: 5 INJECTION, SOLUTION INFILTRATION at 10:06

## 2022-10-20 RX ADMIN — FENTANYL CITRATE 50 MCG: 50 INJECTION INTRAMUSCULAR; INTRAVENOUS at 10:02

## 2022-10-20 RX ADMIN — PROPOFOL 50 MCG/KG/MIN: 10 INJECTION, EMULSION INTRAVENOUS at 10:06

## 2022-10-20 RX ADMIN — SODIUM CHLORIDE, POTASSIUM CHLORIDE, SODIUM LACTATE AND CALCIUM CHLORIDE: 600; 310; 30; 20 INJECTION, SOLUTION INTRAVENOUS at 09:10

## 2022-10-20 RX ADMIN — FENTANYL CITRATE 50 MCG: 50 INJECTION INTRAMUSCULAR; INTRAVENOUS at 10:06

## 2022-10-20 ASSESSMENT — PAIN - FUNCTIONAL ASSESSMENT: PAIN_FUNCTIONAL_ASSESSMENT: 0-10

## 2022-10-20 NOTE — H&P
History and Physical      CHIEF COMPLAINT: Left ring finger pain    HISTORY OF PRESENT ILLNESS:      Catching and locking of the finger which has been persistent    Past Medical History:        Diagnosis Date    Anxiety     GERD (gastroesophageal reflux disease)     Hyperlipidemia     Hypertension     Predominant disturbance of emotions     Prostate cancer ([facility] 2013    Stress      Past Surgical History:        Procedure Laterality Date    APPENDECTOMY      COLONOSCOPY  5/09/2016    NECK SURGERY      PROSTATECTOMY  7/15/2013    laparoscpic robotic assisted. bilymph node dissection/ ureteroscopy     Social History:    TOBACCO:   reports that he has never smoked. He has never used smokeless tobacco.  ETOH:   reports current alcohol use of about 4.0 standard drinks per week. DRUGS:   reports no history of drug use. Family History:       Problem Relation Age of Onset    Arthritis Mother     Stroke Father     Diabetes Father      Medications Prior to Admission:  No medications prior to admission. Allergies:  Patient has no known allergies. CONSTITUTIONAL:  negative for  chills and anorexia  HEENT:  negative for  tinnitus  RESPIRATORY:  negative for  dyspnea and cyanosis  CARDIOVASCULAR:  negative for  palpitations, syncope  GASTROINTESTINAL:  negative for vomiting and hematemesis  GENITOURINARY:  negative for hematuria  ENDOCRINE:  negative for tremor  MUSCULOSKELETAL:  , decreased range of motion with locking left ring finger  NEUROLOGICAL:  negative for seizures and syncope,    BEHAVIOR/PSYCH:  negative for agitated and anxiety    PHYSICAL EXAM:  Ht 5' 11\" (1.803 m)   Wt 180 lb (81.6 kg)   BMI 25.10 kg/m²   General appearance:  awake, alert, cooperative, no apparent distress, and appears stated age  Neurologic:  Awake, alert, oriented to name, place and time. Cranial nerves II-XII are grossly intact. Motor is 5 out of 5 bilaterally. Cerebellar finger to nose, heel to shin intact. Sensory is intact. Babinski down going, Romberg negative, and gait is normal.  Lungs:  No increased work of breathing, good air exchange, clear to auscultation bilaterally, no crackles or wheezing  Heart:  Normal apical impulse, regular rate and rhythm, normal S1 and S2, no S3 or S4, and no murmur noted  Abdomen:  normal bowel sounds  Skin: warm and dry, no rash or erythema  ENT: tympanic membrane, external ear and ear canal normal bilaterally, oropharynx clear and moist with normal mucous membranes  Musculoskeletal:  , Prominence of flexor tendon at A1 band left ring finger with catching.   Overall good range of motion with stable joint    General Labs:  CBC:   Lab Results   Component Value Date/Time    WBC 3.1 02/23/2021 12:00 AM    RBC 4.53 04/04/2018 12:00 AM    HGB 14.3 02/23/2021 12:00 AM    HCT 42.7 02/23/2021 12:00 AM    MCV 91.1 11/16/2017 07:45 PM    RDW 13.5 11/16/2017 07:45 PM     02/23/2021 12:00 AM     CMP:    Lab Results   Component Value Date/Time     04/04/2018 12:00 AM    K 3.6 05/12/2022 12:00 AM     04/04/2018 12:00 AM    CO2 30 04/04/2018 12:00 AM    BUN 23 04/04/2018 12:00 AM    PROT 6.8 11/16/2017 07:45 PM     U/A:  No components found for: [place], USPGRAV, UPH, UPROTEIN, UGLUCOSE, UKETONE, UBILI, UBLOOD, UNITRITE, UUROBIL, [place], USQEPI, [place], [facility], [facility]    Radiology: None    ASSESSMENT AND PLAN:    Flexor tenovaginotomy left ring finger      Electronically signed by Ugo Willoughby DO on 10/20/2022 at 7:24 AM

## 2022-10-20 NOTE — DISCHARGE INSTRUCTIONS
Carpal Tunnel Release/Tenovaginotomy Post-op Instructions  TABATHA Gambino  [phone]    Elevate operative hand for the next 24-48 hours. Ice to operative hand for the next 24-48 hours. (only during waking hours)    Limit use of operative hand. Move fingers of operative hand. Keep dressing clean and dry until changed by Dr. Del Michael. Resume regular diet and medication (unless instructed otherwise by your doctor). You should have a responsible adult with you for 24 hrs. No driving or return to work until approved by Dr. Del Michael. Take medications as prescribed. If any problems occur or if you have any further questions, please call your doctor as soon as possible. If you find that you cannot reach your doctor but feel that your condition needs a doctors attention go to an emergency room.

## 2022-10-20 NOTE — ANESTHESIA PROCEDURE NOTES
Peripheral Block    Patient location during procedure: OR  Reason for block: primary anesthetic  Start time: 10/20/2022 10:01 AM  End time: 10/20/2022 10:07 AM  Staffing  Performed: other anesthesia staff   Anesthesiologist: Hayden Sosa MD  Resident/CRNA: ANICETO Centeno - MITCHEL  Other anesthesia staff: Mary Maldonado RN  Preanesthetic Checklist  Completed: patient identified, IV checked, site marked, risks and benefits discussed, surgical/procedural consents, equipment checked, pre-op evaluation, timeout performed, anesthesia consent given, oxygen available, monitors applied/VS acknowledged, fire risk safety assessment completed and verbalized and blood product R/B/A discussed and consented  Peripheral Block   Patient position: supine  Prep: alcohol swabs  Provider prep: mask  Patient monitoring: cardiac monitor, continuous pulse ox, continuous capnometry, frequent blood pressure checks, IV access, oxygen and responsive to questions  Block type: Deanna block  Laterality: left  Injection technique: catheter  Guidance: other    Assessment   Hemodynamics: stable    Medications Administered  lidocaine PF 0.5 % - IntraVENous, Hand Left   50 mL - 10/20/2022 10:06:00 AM

## 2022-10-20 NOTE — OP NOTE
Operative report        DATE OF PROCEDURE: 10/20/2022     SURGEON: Heide Tom    ASSISTANT: None    PREOPERATIVE DIAGNOSIS: Stenosing tenovaginitis (trigger finger) left ring finger    POSTOPERATIVE DIAGNOSIS: Same    OPERATION: Flexor tenovaginotomy left ring finger    ANESTHESIA: IV regional    ESTIMATED BLOOD LOSS: Scant    COMPLICATIONS: None    SPECIMENS: Was sent to pathology    HISTORY: The patient is a 81y.o. year old male with history of above preop diagnosis. I explained the risk, benefits, expected outcome, and alternatives to the procedure. Patient understands and is in agreement. PROCEDURE: The patient is brought the operating room after signs are identified. Adequate IV regional anesthetic was administered by anesthesia with the patient supine on the operating table. The arm was then prepped and draped sterile fashion. An incision was made transverse just distal to distal palmar crease over the fourth ray of the left hand. 2 and half power loupe magnification was used throughout the procedure. The soft tissues were elevated from the flexor sheath and the A1 band and then the tenosynovium was incised proximally and the A1 band was released throughout its entire length. This freed up the tendon and allowed it to be pulled up out of its bed to ensure complete excursion. It was then replaced. The wound was then thoroughly irrigated and skin closed with 6-0 Prolene simple erupted sutures. Xeroform gauze and a bulky dressing were applied. The tourniquet was deflated and good circulation turned to the upper extremity. Patient was then taken recovery in stable condition. GROSS PATHOLOGY: Thick tight A1 band flexor system left ring finger with flexor stenosis and tight tenosynovium    All reasonable efforts have been made to minimize the risk of errors that may occur in the use of voice recognition and other electronic means of charting.       Electronically signed by Brenda Grajeda DO on 10/20/22 at 10:38 AM EDT

## 2022-10-20 NOTE — ANESTHESIA POSTPROCEDURE EVALUATION
Department of Anesthesiology  Postprocedure Note    Patient: Evelio Ortiz  MRN: [id number]  YOB: 1940  Date of evaluation: 10/20/2022      Procedure Summary     Date: 10/20/22 Room / Location: [facility] OR 01 / [facility]    Anesthesia Start: 4343 Anesthesia Stop: 9389    Procedure: LEFT Ring FINGER TRIGGER RELEASE (Left) Diagnosis:       Trigger ring finger of left hand      (Trigger ring finger of left hand [M65.342])    Surgeons: Loni Ann DO Responsible Provider: Swathi Dickerson MD    Anesthesia Type: Salley block, MAC ASA Status: 3          Anesthesia Type: No value filed.     Remberto Phase I: Remberto Score: 10    Remberto Phase II: Remberto Score: 10      Anesthesia Post Evaluation    Patient location during evaluation: PACU  Patient participation: complete - patient participated  Level of consciousness: awake  Pain score: 0  Airway patency: patent  Nausea & Vomiting: no nausea  Complications: no  Cardiovascular status: hemodynamically stable  Respiratory status: acceptable  Hydration status: stable  Multimodal analgesia pain management approach

## 2022-10-31 ENCOUNTER — OFFICE VISIT (OUTPATIENT)
Dept: ORTHOPEDIC SURGERY | Age: 82
End: 2022-10-31

## 2022-10-31 VITALS — HEIGHT: 71 IN | BODY MASS INDEX: 24.78 KG/M2 | WEIGHT: 177 LBS | TEMPERATURE: 98 F

## 2022-10-31 DIAGNOSIS — M65.342 TRIGGER FINGER, LEFT RING FINGER: Primary | ICD-10-CM

## 2022-10-31 PROCEDURE — 99024 POSTOP FOLLOW-UP VISIT: CPT | Performed by: ORTHOPAEDIC SURGERY

## 2022-10-31 NOTE — PROGRESS NOTES
Chief Complaint:   Chief Complaint   Patient presents with    Post-Op Check     Left hand trigger finger f/u DOS 10/20/22       Mary Kim follows up 11 days postop left ring finger trigger release. He is doing well. He has no more catching or pain in the fingers. He is going easy on it but has done some tasks. Allergies; medications; past medical, surgical, family, and social history; and problem list have been reviewed today and updated as indicated in this encounter seen below. Exam: The incision is well approximated. Sutures were removed today without incident. Range of motion of fingers good with no catching. He has no complaints. Radiographs: None    ASSESSMENT:    Beti Gonzalez was seen today for post-op check. Diagnoses and all orders for this visit:    Trigger finger, left ring finger        PLAN: Gradually increase activity. Band-Aid dressing or something similar for week and a half will help protect wound early on. We will follow on an as-needed basis    Return if symptoms worsen or fail to improve. Current Outpatient Medications   Medication Sig Dispense Refill    pantoprazole (PROTONIX) 40 MG tablet Take 1 tablet by mouth every morning (before breakfast) 90 tablet 1    citalopram (CELEXA) 20 MG tablet Take 1 tablet by mouth in the morning. 90 tablet 1    hydroCHLOROthiazide (HYDRODIURIL) 50 MG tablet Take 1 tablet by mouth every morning 90 tablet 1    pravastatin (PRAVACHOL) 20 MG tablet TAKE 1 TABLET EVERY EVENING 90 tablet 1    multivitamin-iron-minerals-folic acid (CENTRUM) chewable tablet Take 1 tablet by mouth daily. Cholecalciferol (VITAMIN D) 2000 UNITS CAPS capsule Take by mouth Daily Takes 3000       No current facility-administered medications for this visit.        Patient Active Problem List   Diagnosis    Prostate cancer ([facility] [address]    Familial hypercholesterolemia    Right inguinal pain    Left inguinal pain    Rash    Trigger ring finger of left hand       Past Medical History:   Diagnosis Date    Anxiety     GERD (gastroesophageal reflux disease)     Hyperlipidemia     Hypertension     Predominant disturbance of emotions     Prostate cancer ([address] 2013    Stress        Past Surgical History:   Procedure Laterality Date    APPENDECTOMY      COLONOSCOPY  5/09/2016    FINGER TRIGGER RELEASE Left 10/20/2022    LEFT Ring FINGER TRIGGER RELEASE performed by Sid Mcneil DO at [address]  7/15/2013    laparoscpic robotic assisted. bilymph node dissection/ ureteroscopy       No Known Allergies    Social History     Socioeconomic History    Marital status: Life Partner     Spouse name: None    Number of children: None    Years of education: None    Highest education level: None   Tobacco Use    Smoking status: Never    Smokeless tobacco: Never   Vaping Use    Vaping Use: Never used   Substance and Sexual Activity    Alcohol use: Yes     Alcohol/week: 4.0 standard drinks     Types: 3 Glasses of wine, 1 Cans of beer per week     Comment: 4 nights/wk    Drug use: No    Sexual activity: Never     Social Determinants of Health     Financial Resource Strain: Low Risk     Difficulty of Paying Living Expenses: Not hard at all   Food Insecurity: No Food Insecurity    Worried About Running Out of Food in the Last Year: Never true    Ran Out of Food in the Last Year: Never true   Physical Activity: Sufficiently Active    Days of Exercise per Week: 5 days    Minutes of Exercise per Session: 40 min   Intimate Partner Violence: Not At Risk    Fear of Current or Ex-Partner: No    Emotionally Abused: No    Physically Abused: No    Sexually Abused: No       Review of Systems  As follows except as previously noted in HPI:  Constitutional: Negative for chills, diaphoresis, fatigue, fever and unexpected weight change. Respiratory: Negative for cough, shortness of breath and wheezing. Cardiovascular: Negative for chest pain and palpitations.    Neurological: Negative for dizziness, syncope, cephalgia. GI / : negative  Musculoskeletal: see HPI       Objective:   Physical Exam   Constitutional: Oriented to person, place, and time. and appears well-developed and well-nourished. :   Head: Normocephalic and atraumatic. Eyes: EOM are normal.   Neck: Neck supple. Cardiovascular: Normal rate and regular rhythm. Pulmonary/Chest: Effort normal. No stridor. No respiratory distress, no wheezes. Abdominal:  No abnormal distension. Neurological: Alert and oriented to person, place, and time. Skin: Skin is warm and dry. Psychiatric: Normal mood and affect.  Behavior is normal. Thought content normal.    HÉCTOR Ritter DO    10/31/22  9:17 AM

## 2023-02-01 PROBLEM — F32.5 MAJOR DEPRESSIVE DISORDER, SINGLE EPISODE IN FULL REMISSION (HCC): Status: ACTIVE | Noted: 2023-02-01

## 2023-05-18 ENCOUNTER — HOSPITAL ENCOUNTER (OUTPATIENT)
Age: 83
Discharge: HOME | End: 2023-05-18
Payer: MEDICARE

## 2023-05-18 LAB
BUN SERPL-MCNC: 32 MG/DL (ref 6–23)
CREAT SERPL-MCNC: 1.5 MG/DL (ref 0.7–1.2)

## 2023-05-18 PROCEDURE — 36415 COLL VENOUS BLD VENIPUNCTURE: CPT

## 2023-05-18 PROCEDURE — 82565 ASSAY OF CREATININE: CPT

## 2023-05-18 PROCEDURE — 84520 ASSAY OF UREA NITROGEN: CPT

## 2023-05-20 ENCOUNTER — HOSPITAL ENCOUNTER (OUTPATIENT)
Dept: CT IMAGING | Age: 83
End: 2023-05-20
Payer: MEDICARE

## 2023-05-20 DIAGNOSIS — N25.9 DISORDER RESULTING FROM IMPAIRED RENAL TUBULAR FUNCTION, UNSPECIFIED: ICD-10-CM

## 2023-05-20 PROCEDURE — 74178 CT ABD&PLV WO CNTR FLWD CNTR: CPT

## 2023-05-20 PROCEDURE — 6360000004 HC RX CONTRAST MEDICATION: Performed by: RADIOLOGY

## 2023-05-20 RX ADMIN — IOPAMIDOL 120 ML: 755 INJECTION, SOLUTION INTRAVENOUS at 11:14

## 2023-10-15 ENCOUNTER — PREP FOR PROCEDURE (OUTPATIENT)
Dept: UROLOGY | Age: 83
End: 2023-10-15

## 2023-10-15 RX ORDER — SODIUM CHLORIDE 0.9 % (FLUSH) 0.9 %
5-40 SYRINGE (ML) INJECTION EVERY 12 HOURS SCHEDULED
Status: CANCELLED | OUTPATIENT
Start: 2023-10-15

## 2023-10-15 RX ORDER — SODIUM CHLORIDE 9 MG/ML
INJECTION, SOLUTION INTRAVENOUS CONTINUOUS
Status: CANCELLED | OUTPATIENT
Start: 2023-10-15

## 2023-10-15 RX ORDER — SODIUM CHLORIDE 9 MG/ML
INJECTION, SOLUTION INTRAVENOUS PRN
Status: CANCELLED | OUTPATIENT
Start: 2023-10-15

## 2023-10-15 RX ORDER — SODIUM CHLORIDE 0.9 % (FLUSH) 0.9 %
5-40 SYRINGE (ML) INJECTION PRN
Status: CANCELLED | OUTPATIENT
Start: 2023-10-15

## 2023-10-17 NOTE — PROGRESS NOTES
[address] [facility] PRE-ADMISSION TESTING INSTRUCTIONS    The Preadmission Testing patient is instructed accordingly using the following criteria (check applicable):    ARRIVAL INSTRUCTIONS:  [x] Parking the day of Surgery is located in the Main Entrance lot. Upon entering the door, make an immediate right to the surgery reception desk    [x] Bring photo ID and insurance card    [x] Bring in a copy of Living will or Durable Power of  papers. [x] Please be sure to arrange for responsible adult to provide transportation to and from the hospital    [x] Please arrange for responsible adult to be with you for the 24 hour period post procedure due to having anesthesia    [x] If you awake am of surgery not feeling well or have temperature >100 please call [phone]    GENERAL INSTRUCTIONS:    [x] Nothing by mouth after midnight, including gum, candy, mints or water    [x] You may brush your teeth, but do not swallow any water    [x] Take medications as instructed with 1-2 oz of water    [x] Stop herbal supplements and vitamins  prior to procedure    [] Follow preop dosing of blood thinners per physician instructions    [] Take 1/2 dose of evening insulin, but no insulin after midnight    [] No oral diabetic medications after midnight    [] If diabetic and have low blood sugar or feel symptomatic, take 1-2oz apple juice only    [] Bring inhalers day of surgery    [] Bring C-PAP/ Bi-Pap day of surgery    [] Bring urine specimen day of surgery    [x] Shower or bath with soap, lather and rinse well, AM of Surgery, no lotion, powders or creams to surgical site    [] Follow bowel prep as instructed per surgeon    [x] No tobacco products within 24 hours of surgery     [x] No alcohol or illegal drug use within 24 hours of surgery.     [x] Jewelry, body piercing's, eyeglasses, contact lenses and dentures are not permitted into surgery (bring cases)      [x] Please do not wear any nail polish, make up

## 2023-10-19 ENCOUNTER — ANESTHESIA EVENT (OUTPATIENT)
Dept: OPERATING ROOM | Age: 83
End: 2023-10-19
Payer: MEDICARE

## 2023-10-19 ENCOUNTER — ANESTHESIA (OUTPATIENT)
Dept: OPERATING ROOM | Age: 83
End: 2023-10-19
Payer: MEDICARE

## 2023-10-19 ENCOUNTER — HOSPITAL ENCOUNTER (OUTPATIENT)
Age: 83
Setting detail: OUTPATIENT SURGERY
Discharge: HOME | End: 2023-10-19
Attending: UROLOGY | Admitting: UROLOGY
Payer: MEDICARE

## 2023-10-19 ENCOUNTER — HOSPITAL ENCOUNTER (OUTPATIENT)
Dept: GENERAL RADIOLOGY | Age: 83
Setting detail: OUTPATIENT SURGERY
Discharge: HOME | End: 2023-10-21
Attending: UROLOGY
Payer: MEDICARE

## 2023-10-19 VITALS
DIASTOLIC BLOOD PRESSURE: 69 MMHG | BODY MASS INDEX: 25.48 KG/M2 | OXYGEN SATURATION: 97 % | RESPIRATION RATE: 14 BRPM | WEIGHT: 182 LBS | HEIGHT: 71 IN | TEMPERATURE: 96.8 F | HEART RATE: 65 BPM | SYSTOLIC BLOOD PRESSURE: 115 MMHG

## 2023-10-19 DIAGNOSIS — R52 PAIN: ICD-10-CM

## 2023-10-19 DIAGNOSIS — C61 MALIGNANT NEOPLASM OF PROSTATE (HCC): ICD-10-CM

## 2023-10-19 DIAGNOSIS — D07.5 CARCINOMA IN SITU OF PROSTATE: ICD-10-CM

## 2023-10-19 DIAGNOSIS — N28.9 URETERAL SLUDGE: ICD-10-CM

## 2023-10-19 PROBLEM — R82.89 POSITIVE URINARY CYTOLOGY: Status: ACTIVE | Noted: 2023-10-19

## 2023-10-19 PROCEDURE — 7100000010 HC PHASE II RECOVERY - FIRST 15 MIN: Performed by: UROLOGY

## 2023-10-19 PROCEDURE — 2580000003 HC RX 258

## 2023-10-19 PROCEDURE — 74420 UROGRAPHY RTRGR +-KUB: CPT

## 2023-10-19 PROCEDURE — 2580000003 HC RX 258: Performed by: NURSE ANESTHETIST, CERTIFIED REGISTERED

## 2023-10-19 PROCEDURE — 7100000011 HC PHASE II RECOVERY - ADDTL 15 MIN: Performed by: UROLOGY

## 2023-10-19 PROCEDURE — C1758 CATHETER, URETERAL: HCPCS | Performed by: UROLOGY

## 2023-10-19 PROCEDURE — 87086 URINE CULTURE/COLONY COUNT: CPT

## 2023-10-19 PROCEDURE — 3600000002 HC SURGERY LEVEL 2 BASE: Performed by: UROLOGY

## 2023-10-19 PROCEDURE — 2709999900 HC NON-CHARGEABLE SUPPLY: Performed by: UROLOGY

## 2023-10-19 PROCEDURE — 3600000012 HC SURGERY LEVEL 2 ADDTL 15MIN: Performed by: UROLOGY

## 2023-10-19 PROCEDURE — 6360000002 HC RX W HCPCS: Performed by: NURSE ANESTHETIST, CERTIFIED REGISTERED

## 2023-10-19 PROCEDURE — 6360000002 HC RX W HCPCS

## 2023-10-19 PROCEDURE — 3700000001 HC ADD 15 MINUTES (ANESTHESIA): Performed by: UROLOGY

## 2023-10-19 PROCEDURE — 3700000000 HC ANESTHESIA ATTENDED CARE: Performed by: UROLOGY

## 2023-10-19 RX ORDER — PROCHLORPERAZINE EDISYLATE 5 MG/ML
5 INJECTION INTRAMUSCULAR; INTRAVENOUS
Status: DISCONTINUED | OUTPATIENT
Start: 2023-10-19 | End: 2023-10-19 | Stop reason: HOSPADM

## 2023-10-19 RX ORDER — SODIUM CHLORIDE 0.9 % (FLUSH) 0.9 %
5-40 SYRINGE (ML) INJECTION EVERY 12 HOURS SCHEDULED
Status: DISCONTINUED | OUTPATIENT
Start: 2023-10-19 | End: 2023-10-19 | Stop reason: HOSPADM

## 2023-10-19 RX ORDER — FENTANYL CITRATE 50 UG/ML
INJECTION, SOLUTION INTRAMUSCULAR; INTRAVENOUS PRN
Status: DISCONTINUED | OUTPATIENT
Start: 2023-10-19 | End: 2023-10-19 | Stop reason: SDUPTHER

## 2023-10-19 RX ORDER — ONDANSETRON 2 MG/ML
4 INJECTION INTRAMUSCULAR; INTRAVENOUS
Status: DISCONTINUED | OUTPATIENT
Start: 2023-10-19 | End: 2023-10-19 | Stop reason: HOSPADM

## 2023-10-19 RX ORDER — ONDANSETRON 2 MG/ML
4 INJECTION INTRAMUSCULAR; INTRAVENOUS EVERY 6 HOURS PRN
Status: DISCONTINUED | OUTPATIENT
Start: 2023-10-19 | End: 2023-10-19 | Stop reason: HOSPADM

## 2023-10-19 RX ORDER — DEXTROSE MONOHYDRATE 100 MG/ML
INJECTION, SOLUTION INTRAVENOUS CONTINUOUS PRN
Status: DISCONTINUED | OUTPATIENT
Start: 2023-10-19 | End: 2023-10-19 | Stop reason: HOSPADM

## 2023-10-19 RX ORDER — CEPHALEXIN 500 MG/1
500 CAPSULE ORAL 2 TIMES DAILY
Qty: 10 CAPSULE | Refills: 0 | Status: SHIPPED | OUTPATIENT
Start: 2023-10-19 | End: 2023-10-24

## 2023-10-19 RX ORDER — IPRATROPIUM BROMIDE AND ALBUTEROL SULFATE 2.5; .5 MG/3ML; MG/3ML
1 SOLUTION RESPIRATORY (INHALATION)
Status: DISCONTINUED | OUTPATIENT
Start: 2023-10-19 | End: 2023-10-19 | Stop reason: HOSPADM

## 2023-10-19 RX ORDER — SODIUM CHLORIDE 9 MG/ML
INJECTION, SOLUTION INTRAVENOUS PRN
Status: DISCONTINUED | OUTPATIENT
Start: 2023-10-19 | End: 2023-10-19 | Stop reason: HOSPADM

## 2023-10-19 RX ORDER — DIPHENHYDRAMINE HYDROCHLORIDE 50 MG/ML
12.5 INJECTION INTRAMUSCULAR; INTRAVENOUS
Status: DISCONTINUED | OUTPATIENT
Start: 2023-10-19 | End: 2023-10-19 | Stop reason: HOSPADM

## 2023-10-19 RX ORDER — SODIUM CHLORIDE 9 MG/ML
INJECTION, SOLUTION INTRAVENOUS CONTINUOUS
Status: DISCONTINUED | OUTPATIENT
Start: 2023-10-19 | End: 2023-10-19 | Stop reason: HOSPADM

## 2023-10-19 RX ORDER — SODIUM CHLORIDE 0.9 % (FLUSH) 0.9 %
5-40 SYRINGE (ML) INJECTION PRN
Status: DISCONTINUED | OUTPATIENT
Start: 2023-10-19 | End: 2023-10-19 | Stop reason: HOSPADM

## 2023-10-19 RX ORDER — FENTANYL CITRATE 50 UG/ML
25 INJECTION, SOLUTION INTRAMUSCULAR; INTRAVENOUS EVERY 5 MIN PRN
Status: DISCONTINUED | OUTPATIENT
Start: 2023-10-19 | End: 2023-10-19 | Stop reason: HOSPADM

## 2023-10-19 RX ORDER — PROPOFOL 10 MG/ML
INJECTION, EMULSION INTRAVENOUS CONTINUOUS PRN
Status: DISCONTINUED | OUTPATIENT
Start: 2023-10-19 | End: 2023-10-19 | Stop reason: SDUPTHER

## 2023-10-19 RX ORDER — SODIUM CHLORIDE 9 MG/ML
INJECTION, SOLUTION INTRAVENOUS CONTINUOUS PRN
Status: DISCONTINUED | OUTPATIENT
Start: 2023-10-19 | End: 2023-10-19 | Stop reason: SDUPTHER

## 2023-10-19 RX ADMIN — FENTANYL CITRATE 50 MCG: 50 INJECTION, SOLUTION INTRAMUSCULAR; INTRAVENOUS at 12:54

## 2023-10-19 RX ADMIN — PROPOFOL 125 MCG/KG/MIN: 10 INJECTION, EMULSION INTRAVENOUS at 12:34

## 2023-10-19 RX ADMIN — PROPOFOL 20 MG: 10 INJECTION, EMULSION INTRAVENOUS at 12:35

## 2023-10-19 RX ADMIN — SODIUM CHLORIDE: 9 INJECTION, SOLUTION INTRAVENOUS at 12:25

## 2023-10-19 RX ADMIN — PROPOFOL 20 MG: 10 INJECTION, EMULSION INTRAVENOUS at 12:36

## 2023-10-19 RX ADMIN — WATER 2000 MG: 1 INJECTION INTRAMUSCULAR; INTRAVENOUS; SUBCUTANEOUS at 12:25

## 2023-10-19 RX ADMIN — PROPOFOL 20 MG: 10 INJECTION, EMULSION INTRAVENOUS at 12:37

## 2023-10-19 NOTE — ANESTHESIA POSTPROCEDURE EVALUATION
Department of Anesthesiology  Postprocedure Note    Patient: Iker Fox  MRN: [id number]  YOB: 1940  Date of evaluation: 10/19/2023      Procedure Summary     Date: 10/19/23 Room / Location: [facility] OR 06 / [facility]    Anesthesia Start: 5433 Anesthesia Stop: 8088    Procedure: CYSTOSCOPY RETROGRADE PYELOGRAM, BLADDER AND BILATERAL KIDNEY [address] (Bladder) Diagnosis:       Malignant neoplasm of prostate ([address])      Carcinoma in situ of prostate      Ureteral sludge      (Malignant neoplasm of prostate ([address]) Sol Desanctis)      (Carcinoma in situ of prostate [D07.5])      (Ureteral sludge [N28.9])    Surgeons: Cheryle Burk MD Responsible Provider: Danny Truong DO    Anesthesia Type: MAC ASA Status: 2          Anesthesia Type: MAC    Remberto Phase I: Remberto Score: 10    Remberto Phase II:        Anesthesia Post Evaluation    Patient location during evaluation: PACU  Patient participation: complete - patient participated  Level of consciousness: awake  Pain score: 1  Airway patency: patent  Nausea & Vomiting: no nausea and no vomiting  Complications: no  Cardiovascular status: hemodynamically stable  Respiratory status: acceptable  Hydration status: stable  Pain management: adequate

## 2023-10-19 NOTE — DISCHARGE INSTRUCTIONS
You may see blood in urine  You may have lower abdominal pain(bladder spasms)  No driving till tomorrow  No dietary restrictions/you may shower tomorrow  Call [phone] for appt for Monday or emergencies  Keep  Stool soft with milk of magnesia,prune juice or Senakot   We will call you we will call you with the cytology results

## 2023-10-19 NOTE — BRIEF OP NOTE
Brief Postoperative Note      Patient: Mg Scott  YOB: 1940  MRN: [id number]    Date of Procedure: 10/19/2023    Pre-Op Diagnosis Codes:     * Malignant neoplasm of prostate ([address]) [C61]     * Carcinoma in situ of prostate [D07.5]     * Ureteral sludge [N28.9]  Stage 0 prostatic carcinoma  Positive urine cytology  Radiation cystitis/assess upper tract for source of the positive cytology    Post-Op Diagnosis: No obvious renal or bladder lesions accounting for the positive cytology  Staging zero prostate carcinoma/inactive radiation cystitis mild bladder neck contracture       Procedure: Cystoscopy bilateral retrograde pyelograms cytology specimens from the bladder and both renal pelvicies/dilatation of the bladder neck    Surgeon(s):  Daylin Ray MD    Assistant: None      Anesthesia: Monitor Anesthesia Care    Estimated Blood Loss (mL): None     Complications: None    Specimens:   Bladder urine for cytology/cytology specimens from the renal pelvis left and right    Implants:      Drains: None    Findings: As above      Electronically signed by Sally Schwartz MD on 10/19/2023 at 12:14 PM

## 2023-10-20 LAB
MICROORGANISM SPEC CULT: NO GROWTH
SPECIMEN DESCRIPTION: NORMAL

## 2023-10-20 NOTE — OP NOTE
[address]                  [address] [facility], [address] [place] [address]                                OPERATIVE REPORT    PATIENT NAME: Cristopher Mccullough                     :        1940  MED REC NO:   [id number]                            ROOM:  ACCOUNT NO:   [de-identified]                           ADMIT DATE: 10/19/2023  PROVIDER:     Pedro Ray MD    DATE OF PROCEDURE:  10/19/2023    PREOPERATIVE DIAGNOSES:  1.  Stage 0 prostatic carcinoma, treated with previous prostatectomy and  radiation. 2.  Positive urine cytology. 3.  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 Assess for upper tracts for source of cytology. POSTOPERATIVE DIAGNOSES:  No obvious renal or bladder lesions accounting  for positive cytology. He had a mild bladder neck contracture, stage 0  prostatic carcinoma, and inactive radiation cystitis. OPERATION PERFORMED:  Cystopanendoscopy, bilateral retrograde  pyelograms, barbotage urine cytology specimens from the bladder and both  renal pelvises, and dilatation of the bladder neck. ANESTHESIA:  Monitored sedation. BLOOD LOSS:  None. COMPLICATIONS:  None. DESCRIPTION OF PROCEDURE:  The time-out was read by me, the Anesthesia,  and the operating staff, reviewed history and physical, allergy, and  medication. A 2 gm of Ancef was given upon induction. The patient was  placed in lithotomy position and prepped and draped in the usual  fashion. No undue tension to hips, knees, or buttocks. A #21  panendoscope was placed in the urethra and there was a mild bladder neck  contracture, dilated with the scope only. Urine from the bladder was  obtained for culture and cytology.   Careful examination of the bladder  with a 70- and 30-degree angle lens showed trabeculation, cellule, and  early diverticular formation _____ small diverticula in the posterior  and lateral aspects of the bladder were seen and

## 2023-10-23 LAB — NON-GYN CYTOLOGY REPORT: NORMAL

## 2025-02-26 ENCOUNTER — HOSPITAL ENCOUNTER (OUTPATIENT)
Dept: GENERAL RADIOLOGY | Age: 85
Discharge: HOME | End: 2025-02-28
Payer: MEDICARE

## 2025-02-26 ENCOUNTER — HOSPITAL ENCOUNTER (OUTPATIENT)
Age: 85
Discharge: HOME | End: 2025-02-28
Payer: MEDICARE

## 2025-02-26 DIAGNOSIS — G89.29 CHRONIC PAIN OF LEFT KNEE: ICD-10-CM

## 2025-02-26 DIAGNOSIS — M25.562 CHRONIC PAIN OF LEFT KNEE: ICD-10-CM

## 2025-02-26 PROCEDURE — 73562 X-RAY EXAM OF KNEE 3: CPT

## 2025-03-11 SDOH — HEALTH STABILITY: PHYSICAL HEALTH: ON AVERAGE, HOW MANY DAYS PER WEEK DO YOU ENGAGE IN MODERATE TO STRENUOUS EXERCISE (LIKE A BRISK WALK)?: 4 DAYS

## 2025-03-11 SDOH — HEALTH STABILITY: PHYSICAL HEALTH: ON AVERAGE, HOW MANY MINUTES DO YOU ENGAGE IN EXERCISE AT THIS LEVEL?: 30 MIN

## 2025-03-12 ENCOUNTER — OFFICE VISIT (OUTPATIENT)
Dept: ORTHOPEDIC SURGERY | Age: 85
End: 2025-03-12
Payer: MEDICARE

## 2025-03-12 VITALS — TEMPERATURE: 98 F | WEIGHT: 178 LBS | BODY MASS INDEX: 24.92 KG/M2 | HEIGHT: 71 IN

## 2025-03-12 DIAGNOSIS — M79.89 LEFT LEG SWELLING: Primary | ICD-10-CM

## 2025-03-12 PROCEDURE — 1159F MED LIST DOCD IN RCRD: CPT | Performed by: ORTHOPAEDIC SURGERY

## 2025-03-12 PROCEDURE — 1160F RVW MEDS BY RX/DR IN RCRD: CPT | Performed by: ORTHOPAEDIC SURGERY

## 2025-03-12 PROCEDURE — 1036F TOBACCO NON-USER: CPT | Performed by: ORTHOPAEDIC SURGERY

## 2025-03-12 PROCEDURE — G8427 DOCREV CUR MEDS BY ELIG CLIN: HCPCS | Performed by: ORTHOPAEDIC SURGERY

## 2025-03-12 PROCEDURE — 1125F AMNT PAIN NOTED PAIN PRSNT: CPT | Performed by: ORTHOPAEDIC SURGERY

## 2025-03-12 PROCEDURE — G8420 CALC BMI NORM PARAMETERS: HCPCS | Performed by: ORTHOPAEDIC SURGERY

## 2025-03-12 PROCEDURE — 1123F ACP DISCUSS/DSCN MKR DOCD: CPT | Performed by: ORTHOPAEDIC SURGERY

## 2025-03-12 PROCEDURE — 99213 OFFICE O/P EST LOW 20 MIN: CPT | Performed by: ORTHOPAEDIC SURGERY

## 2025-03-12 NOTE — PROGRESS NOTES
Wai Floyd is a 84 y.o. male, who presents   Chief Complaint   Patient presents with    Knee Pain     Left knee pain and swelling since after bartolo. Whole leg is swollen and causing problems. No past surgery on the knee. Has some popping in the knee at times.        HPI:: Left leg swellings been present for some time.  There is concerned that there may be some knee problems.  Wai's primary doctor had ordered x-rays on the knee.  He also has scheduled an ultrasound test of his left lower extremity.  There does not seem to be a great area of discomfort associated with this..  Walking around with good balance and pace.    Allergies; medications; past medical, surgical, family, and social history; and problem list have been reviewed today and updated as indicated in this encounter - see below following Ortho specifics.    Musculoskeletal: There is edema in both the thigh and the left leg.  Coloration of the skin and temperature is normal.  Range of motion of all joints is good with no instability or pain.    Radiologic Studies: Imaging of the left knee shows some maintenance of joint space though these were nonweightbearing films.  He has a very small marginal osteophyte medial femoral condyle.    ASSESSMENT:  Wai was seen today for knee pain.    Diagnoses and all orders for this visit:    Left leg swelling     Treatment alternatives were reviewed including medical and physical therapies, injections, and surgical options, expected risks benefits and likely outcome of each were discussed in detail, questions asked and answered and understood.  We discussed the symptoms as well as physical findings and imaging results.  I see no pathology in the knee that requires any aggressive treatment.    PLAN: Wai was encouraged to keep his appointment for his ultrasound test which could reveal the cause of his swelling.        Patient Active Problem List   Diagnosis    Prostate cancer (HCC)    Familial

## 2025-04-07 ENCOUNTER — OFFICE VISIT (OUTPATIENT)
Dept: ORTHOPEDIC SURGERY | Age: 85
End: 2025-04-07
Payer: MEDICARE

## 2025-04-07 VITALS — WEIGHT: 173 LBS | BODY MASS INDEX: 24.77 KG/M2 | HEIGHT: 70 IN

## 2025-04-07 DIAGNOSIS — M79.89 LEFT LEG SWELLING: Primary | ICD-10-CM

## 2025-04-07 DIAGNOSIS — M71.22 POPLITEAL CYST, LEFT: ICD-10-CM

## 2025-04-07 PROCEDURE — 1160F RVW MEDS BY RX/DR IN RCRD: CPT | Performed by: ORTHOPAEDIC SURGERY

## 2025-04-07 PROCEDURE — G8427 DOCREV CUR MEDS BY ELIG CLIN: HCPCS | Performed by: ORTHOPAEDIC SURGERY

## 2025-04-07 PROCEDURE — 1123F ACP DISCUSS/DSCN MKR DOCD: CPT | Performed by: ORTHOPAEDIC SURGERY

## 2025-04-07 PROCEDURE — 1036F TOBACCO NON-USER: CPT | Performed by: ORTHOPAEDIC SURGERY

## 2025-04-07 PROCEDURE — 99213 OFFICE O/P EST LOW 20 MIN: CPT | Performed by: ORTHOPAEDIC SURGERY

## 2025-04-07 PROCEDURE — 1159F MED LIST DOCD IN RCRD: CPT | Performed by: ORTHOPAEDIC SURGERY

## 2025-04-07 PROCEDURE — G8420 CALC BMI NORM PARAMETERS: HCPCS | Performed by: ORTHOPAEDIC SURGERY

## 2025-04-07 PROCEDURE — 1125F AMNT PAIN NOTED PAIN PRSNT: CPT | Performed by: ORTHOPAEDIC SURGERY

## 2025-04-07 NOTE — PROGRESS NOTES
Chief Complaint:   Chief Complaint   Patient presents with    Knee Pain     Left Knee F/U        Wai Floyd follows up regarding his left leg swelling.  He continues to have a little bit of swelling in this.  His doctor ordered an ultrasound which was done at [facility].  This was 3/26/2025.  Reported was a 3.6 cm well-circumscribed popliteal cyst with no signs of vascular compromise or occlusion.      Allergies; medications; past medical, surgical, family, and social history; and problem list have been reviewed today and updated as indicated in this encounter seen below.    Exam: There is slight soft tissue prominence in the posterior left knee.  There is no tenderness to palpation present.  There is no instability of the knee.  His calf is supple with no tenderness.    Radiographs: Imaging report showed as mentioned above a 3.6 cm popliteal cyst with no signs of vascular occlusion.    ASSESSMENT:    Wai was seen today for knee pain.    Diagnoses and all orders for this visit:    Left leg swelling    Popliteal cyst, left        PLAN: We discussed the problem and at present no treatment is indicated.  We did discuss compression stockings once again and that would be a good idea to keep him from swelling.  If his symptoms increase in the future follow-up would be indicated.    Return if symptoms worsen or fail to improve.       Current Outpatient Medications   Medication Sig Dispense Refill    loratadine (CLARITIN) 10 MG tablet Take 1 tablet by mouth daily 30 tablet 5    predniSONE (DELTASONE) 5 MG tablet 1 tab 3 times a day for 2 days (after meals), then 1 tab 2 times a day. 10 tablet 0    furosemide (LASIX) 20 MG tablet Take 1 tablet by mouth daily 30 tablet 0    citalopram (CELEXA) 20 MG tablet Take 1 tablet by mouth daily 90 tablet 1    pravastatin (PRAVACHOL) 40 MG tablet TAKE 1 TABLET EVERY EVENING 90 tablet 1    pantoprazole (PROTONIX) 40 MG tablet Take 1 tablet by mouth every morning

## 2025-06-10 ENCOUNTER — HOSPITAL ENCOUNTER (OUTPATIENT)
Age: 85
Discharge: HOME | End: 2025-06-10
Payer: MEDICARE

## 2025-06-10 LAB — PSA SERPL-MCNC: <0.02 NG/ML (ref 0–4)

## 2025-06-10 PROCEDURE — 36415 COLL VENOUS BLD VENIPUNCTURE: CPT

## 2025-06-10 PROCEDURE — 84153 ASSAY OF PSA TOTAL: CPT

## (undated) DEVICE — SOLUTION IRRIG 3000ML 1.5% GL USP UROMATIC CONT

## (undated) DEVICE — MEDICINE CUP, GRADUATED, STER: Brand: MEDLINE

## (undated) DEVICE — ELECTRODE PT RET AD L9FT HI MOIST COND ADH HYDRGEL CORDED

## (undated) DEVICE — CATHETER F BLLN 5CC 16FR 2 W HYDRGEL COAT LESS TRAUM LUB

## (undated) DEVICE — SOLUTION SCRB 4OZ 4% CHG H2O AIDED FOR PREOPERATIVE SKIN

## (undated) DEVICE — SOLUTION IRRIG 1000ML 09% SOD CHL USP PIC PLAS CONTAINER

## (undated) DEVICE — GLOVE ORANGE PI 7   MSG9070

## (undated) DEVICE — GLOVE SURG SZ 8 L12IN FNGR THK94MIL STD WHT LTX FREE

## (undated) DEVICE — CYSTO PACK: Brand: MEDLINE INDUSTRIES, INC.

## (undated) DEVICE — TIBURON EXTREMITY SHEET: Brand: CONVERTORS

## (undated) DEVICE — DRESSING GZ XRFRM 4X4(25/BX 6BX/CS)

## (undated) DEVICE — SOLUTION IRRIG 3000ML STRL H2O USP UROMATIC PLAS CONT

## (undated) DEVICE — CATHETER URET 5FR L70CM OPN END SGL LUMN INJ HUB FLEXIMA

## (undated) DEVICE — HANDLE CVR PATENTED RETENTION DISC STRL LIGHT SHLD

## (undated) DEVICE — GAUZE,SPONGE,4"X4",8PLY,STRL,LF,10/TRAY: Brand: MEDLINE

## (undated) DEVICE — INTENDED FOR TISSUE SEPARATION, AND OTHER PROCEDURES THAT REQUIRE A SHARP SURGICAL BLADE TO PUNCTURE OR CUT.: Brand: BARD-PARKER ® STAINLESS STEEL BLADES

## (undated) DEVICE — HOOK LOCK LATEX FREE ELASTIC BANDAGE 2INX5YD

## (undated) DEVICE — SOLUTION IV IRRIG POUR BRL 0.9% SODIUM CHL 2F7124

## (undated) DEVICE — 4-PORT MANIFOLD: Brand: NEPTUNE 2

## (undated) DEVICE — BANDAGE GZ W2XL75IN ST RAYON POLY CNFRM STRTCH LTWT

## (undated) DEVICE — PREP TRAY 10X5X2: Brand: MEDLINE INDUSTRIES, INC.

## (undated) DEVICE — 20 ML SYRINGE REGULAR TIP: Brand: MONOJECT

## (undated) DEVICE — GLOVE ORANGE PI 7 1/2   MSG9075

## (undated) DEVICE — SPECIMEN CUP W/LID: Brand: DEROYAL

## (undated) DEVICE — SOLUTION SURG PREP ANTIMICROBIAL 4 OZ SKIN WND EXIDINE

## (undated) DEVICE — GOWN,SIRUS,FABRNF,2XL,18/CS: Brand: MEDLINE

## (undated) DEVICE — GOWN SURG XL SMS FAB NONREINFORCED RAGLAN SLV HK LOOP CLSR

## (undated) DEVICE — GLOVE,SURG,SENSICARE,ALOE,LF,PF,7: Brand: MEDLINE

## (undated) DEVICE — SUTURE PROL SZ 6-0 L18IN NONABSORBABLE BLU L16MM PS-3 3/8 8680G

## (undated) DEVICE — PEN: MARKING STD 100/CS: Brand: MEDICAL ACTION INDUSTRIES

## (undated) DEVICE — GLOVE SURG SZ 65 L12IN FNGR THK94MIL STD WHT LTX FREE

## (undated) DEVICE — SYRINGE,TOOMEY,IRRIGATION,70CC,STERILE: Brand: MEDLINE

## (undated) DEVICE — TOWEL OR BLUEE 16X26IN ST 8 PACK ORB08 16X26ORTWL

## (undated) DEVICE — BASIC PACK: Brand: CONVERTORS

## (undated) DEVICE — SOLUTION IRRIG 3000ML 0.9% SOD CHL USP UROMATIC PLAS CONT

## (undated) DEVICE — GAUZE,SPONGE,4"X4",16PLY,XRAY,STRL,LF: Brand: MEDLINE

## (undated) DEVICE — BANDAGE COMPR W4XL108IN WHT LAYERED NO CLSR SYN RUB ESMARCH

## (undated) DEVICE — DOUBLE BASIN SET: Brand: MEDLINE INDUSTRIES, INC.

## (undated) DEVICE — HOSE CONN FOR WST MGMT SYS NEPTUNE 2

## (undated) DEVICE — CHLORAPREP 26ML ORANGE

## (undated) DEVICE — 1810 FOAM BLOCK NEEDLE COUNTER: Brand: DEVON

## (undated) DEVICE — BAG DRNGE COMB PK